# Patient Record
Sex: FEMALE | Race: BLACK OR AFRICAN AMERICAN | NOT HISPANIC OR LATINO | Employment: FULL TIME | URBAN - METROPOLITAN AREA
[De-identification: names, ages, dates, MRNs, and addresses within clinical notes are randomized per-mention and may not be internally consistent; named-entity substitution may affect disease eponyms.]

---

## 2017-01-12 DIAGNOSIS — Z30.09 FAMILY PLANNING: ICD-10-CM

## 2017-01-12 RX ORDER — NORELGESTROMIN AND ETHINYL ESTRADIOL 35; 150 UG/MG; UG/MG
1 PATCH TRANSDERMAL WEEKLY
Qty: 3 PATCH | Refills: 11 | Status: SHIPPED | OUTPATIENT
Start: 2017-01-12 | End: 2018-06-19 | Stop reason: ALTCHOICE

## 2017-03-22 ENCOUNTER — OFFICE VISIT (OUTPATIENT)
Dept: OBSTETRICS AND GYNECOLOGY | Facility: CLINIC | Age: 22
End: 2017-03-22
Payer: MEDICAID

## 2017-03-22 VITALS
SYSTOLIC BLOOD PRESSURE: 117 MMHG | HEIGHT: 64 IN | WEIGHT: 149.13 LBS | DIASTOLIC BLOOD PRESSURE: 82 MMHG | BODY MASS INDEX: 25.46 KG/M2

## 2017-03-22 DIAGNOSIS — Z01.419 WELL WOMAN EXAM WITH ROUTINE GYNECOLOGICAL EXAM: Primary | ICD-10-CM

## 2017-03-22 DIAGNOSIS — Z12.4 CERVICAL CANCER SCREENING: ICD-10-CM

## 2017-03-22 DIAGNOSIS — Z11.3 SCREEN FOR STD (SEXUALLY TRANSMITTED DISEASE): ICD-10-CM

## 2017-03-22 PROCEDURE — 87591 N.GONORRHOEAE DNA AMP PROB: CPT

## 2017-03-22 PROCEDURE — 99395 PREV VISIT EST AGE 18-39: CPT | Mod: S$PBB,,, | Performed by: OBSTETRICS & GYNECOLOGY

## 2017-03-22 PROCEDURE — 99212 OFFICE O/P EST SF 10 MIN: CPT | Mod: PBBFAC | Performed by: OBSTETRICS & GYNECOLOGY

## 2017-03-22 PROCEDURE — 99999 PR PBB SHADOW E&M-EST. PATIENT-LVL II: CPT | Mod: PBBFAC,,, | Performed by: OBSTETRICS & GYNECOLOGY

## 2017-03-22 PROCEDURE — 88175 CYTOPATH C/V AUTO FLUID REDO: CPT | Performed by: PATHOLOGY

## 2017-03-22 PROCEDURE — 88141 CYTOPATH C/V INTERPRET: CPT | Mod: ,,, | Performed by: PATHOLOGY

## 2017-03-22 RX ORDER — METRONIDAZOLE 500 MG/1
TABLET ORAL
COMMUNITY
Start: 2017-03-20 | End: 2017-04-07 | Stop reason: ALTCHOICE

## 2017-03-22 NOTE — MR AVS SNAPSHOT
"    [facility] [place] - OB/ GYN  [address]  [place] [address]  Phone: [phone]                  Pradeep Arias   3/22/2017 3:40 PM   Office Visit    Description:  Female : 1995   Provider:  Hany Carpenter MD   Department:  [facility] [place] - OB/ GYN           Reason for Visit     Annual Exam           Diagnoses this Visit        Comments    Cervical cancer screening    -  Primary     Screen for STD (sexually transmitted disease)                To Do List           Goals (5 Years of Data)     None      [facility]     [facility] On Call Nurse Care Line -  Assistance  Registered nurses in the [facility] provide clinical advisement, health education, appointment booking, and other advisory services.  Call for this free service at [phone].             Medications           Message regarding Medications     Verify the changes and/or additions to your medication regime listed below are the same as discussed with your clinician today.  If any of these changes or additions are incorrect, please notify your healthcare provider.             Verify that the below list of medications is an accurate representation of the medications you are currently taking.  If none reported, the list may be blank. If incorrect, please contact your healthcare provider. Carry this list with you in case of emergency.           Current Medications     metronidazole (FLAGYL) 500 MG tablet     norelgestromin-ethinyl estradiol (ORTHO EVRA) 150-35 mcg/24 hr Place 1 patch onto the skin once a week. For 3 weeks each month and off one week.    prenatal vitamin#7-iron-FA-dha 28-1. mg Cap Take 1 tablet by mouth once daily.           Clinical Reference Information           Your Vitals Were     BP Height Weight Last Period BMI    117/82 (BP Location: Left arm, Patient Position: Sitting, BP Method: Manual) 5' 4" (1.626 m) 67.7 kg (149 lb 2.3 oz) 2017 (Exact Date) 25.6 kg/m2      Blood Pressure       "    Most Recent Value    BP  117/82      Allergies as of 3/22/2017     Codeine      Immunizations Administered on Date of Encounter - 3/22/2017     None      Orders Placed During Today's Visit      Normal Orders This Visit    C. trachomatis/N. gonorrhoeae by AMP DNA Cervix     Liquid-based pap smear, screening       Language Assistance Services     ATTENTION: Language assistance services are available, free of charge. Please call [phone].      ATENCIÓN: Si habla español, tiene a cruz disposición servicios gratuitos de asistencia lingüística. Llame al [phone].     CHÚ Ý: N?u b?n nói Ti?ng Vi?t, có các d?ch v? h? tr? ngôn ng? mi?n phí dành cho b?n. G?i s? [phone].         [facility] [place] - OB/ GYN complies with applicable Federal civil rights laws and does not discriminate on the basis of race, color, national origin, age, disability, or sex.

## 2017-03-23 LAB
C TRACH DNA SPEC QL NAA+PROBE: NOT DETECTED
N GONORRHOEA DNA SPEC QL NAA+PROBE: NOT DETECTED

## 2017-03-26 NOTE — PROGRESS NOTES
"[facility]  Ambulatory Clinic  Obstetrics & Gynecology    Visit Date:  3/22/2017    Chief Complaint:  Annual GYN exam    History of Present Illness:      Pradeep Arias is a 21 y.o.  here for a gynecologic exam.      Pt has no major complaints today.      Periods are regular, not heavy or painful.    Her current method of family planning is Ortho-Evra patches weekly, and reports no problems with this method.      Pt denies family h/o adverse reaction to hormonal contraception.    Pt denies active sexually transmitted infections.    Pt performs monthly self breast examination, non-smoker, uses seat belts, and denies abuse.     Pt denies any abnormal vaginal bleeding, vaginal discharge, dysmenorrhea, dyspareunia, pelvic pain, bloating, early satiety, unintentional weight loss, breast mass/skin changes, GI or urinary complaints.      Otherwise, the pt is in her usual state of health.    Past History:  Gynecologic history as noted above.    Review of Systems:      GENERAL:  No fever, fatigue, excessive weight gain or loss  HEENT:  No headaches, hearing changes, visual disturbance  RESPIRATORY:  No cough, shortness of breath  CARDIOVASCULAR:  No chest pain, heart palpitations, leg swelling  BREAST:  No lump, pain, nipple discharge, skin changes  GASTROINTESTINAL:  No nausea, vomiting, constipation, diarrhea, abd pain, rectal bleeding   GENITOURINARY:  See HPI  ENDOCRINE:  No heat or cold intolerance  HEMATOLOGIC:  No easy bruisability or bleeding   LYMPHATICS:  No enlarged nodes  MUSCULOSKELETAL:  No joint pain or swelling  SKIN:  No rash, lesions, jaundice  NEUROLOGIC:  No dizziness, weakness, syncope  PSYCHIATRIC:  No depression, homicidal/suicidal ideations, anxiety or mood swings    Physical Exam:     /82 (BP Location: Left arm, Patient Position: Sitting, BP Method: Manual)  Ht 5' 4" (1.626 m)  Wt 67.7 kg (149 lb 2.3 oz)  LMP 2017 (Exact Date)  Breastfeeding? No  " BMI 25.6 kg/m2  Pulse 64, Resp rate 16  Patient's last menstrual period was 2017 (exact date).     GENERAL:  No acute distress, well-nourished  HEENT:  Atraumatic, anicteric, moist mucus membranes. Neck supple w/o masses.  BREAST:  Symmetric, nontender, no obvious masses, adenopathy, skin changes or nipple discharge.  LUNGS:  Clear to auscultation  HEART:  Regular rate and rhythm, no murmurs, gallops, or rubs  ABDOMEN:  Soft, non-tender, non-distended, normoactive bowel sounds, no obvious organomegaly  EXT:  Symmetric w/o cramping, claudication, or edema. +2 distal pulses.  SKIN:  No rashes or bruising  PSYCH:  Mood and affect appropriate    GENITOURINARY:    VULVAR:  Female external genitalia w/o obvious lesions. Female hair distribution. Normal urethral meatus. No gross lymphadenopathy.   VAGINA:  Pink, moist, well-rugated. Good support. No obvious lesion. No discharge.  CERVIX:  No cervical motion tenderness, discharge, or obvious lesions.   UTERUS:  Small, non-tender, normal contour  ADNEXA:  No masses, non-tender    RECTAL:  Declined. No obvious external lesions  WET PREP:  Negative     Chaperone present for exam.    Assessment:     21 y.o. :    1. Well woman gynecologic exam  2. Family planning    Plan:    A gynecologic health assessment was performed with age appropriate counseling.    Cervical cancer screening - pap obtained.    STI screening - pt requested gonorrhea & chlamydia testing.  Pt declined other STI testing including HIV.  Safe sex discussed.      Continue ortho evra.  Risks, benefits, and alternatives to Ortho Evra reviewed.    Encourage healthy lifestyle modifications, monthly self breast exams, encourage HPV vaccination, Ca/Vit D, f/u with PCP for health maintenance.    Return 1 year for gynecologic exam, or sooner as needed.  All questions answered, pt voiced understanding.        Hany Carpenter MD

## 2017-03-30 ENCOUNTER — OFFICE VISIT (OUTPATIENT)
Dept: OBSTETRICS AND GYNECOLOGY | Facility: CLINIC | Age: 22
End: 2017-03-30
Payer: MEDICAID

## 2017-03-30 VITALS
HEIGHT: 64 IN | DIASTOLIC BLOOD PRESSURE: 80 MMHG | BODY MASS INDEX: 25.06 KG/M2 | SYSTOLIC BLOOD PRESSURE: 122 MMHG | WEIGHT: 146.81 LBS

## 2017-03-30 DIAGNOSIS — R87.619 ABNORMAL CERVICAL PAPANICOLAOU SMEAR, UNSPECIFIED ABNORMAL PAP FINDING: Primary | ICD-10-CM

## 2017-03-30 PROCEDURE — 99213 OFFICE O/P EST LOW 20 MIN: CPT | Mod: S$PBB,,, | Performed by: OBSTETRICS & GYNECOLOGY

## 2017-03-30 PROCEDURE — 99212 OFFICE O/P EST SF 10 MIN: CPT | Mod: PBBFAC | Performed by: OBSTETRICS & GYNECOLOGY

## 2017-03-30 PROCEDURE — 99999 PR PBB SHADOW E&M-EST. PATIENT-LVL II: CPT | Mod: PBBFAC,,, | Performed by: OBSTETRICS & GYNECOLOGY

## 2017-03-30 NOTE — MR AVS SNAPSHOT
"    [facility] [place] - OB/ GYN  [address]  [place] [address]  Phone: [phone]                  Pradeep Arias   3/30/2017 3:50 PM   Office Visit    Description:  Female : 1995   Provider:  Hany Carpenter MD   Department:  [facility] [place] - OB/ GYN           Reason for Visit     Abnormal Pap Smear                To Do List           Goals (5 Years of Data)     None      [facility]     [facility] Nurse Care Line -  Assistance  Unless otherwise directed by your provider, please contact [facility], our nurse care line that is available for  assistance.     Registered nurses in the [facility] Center provide: appointment scheduling, clinical advisement, health education, and other advisory services.  Call: [phone] (toll free)               Medications           Message regarding Medications     Verify the changes and/or additions to your medication regime listed below are the same as discussed with your clinician today.  If any of these changes or additions are incorrect, please notify your healthcare provider.             Verify that the below list of medications is an accurate representation of the medications you are currently taking.  If none reported, the list may be blank. If incorrect, please contact your healthcare provider. Carry this list with you in case of emergency.           Current Medications     metronidazole (FLAGYL) 500 MG tablet     norelgestromin-ethinyl estradiol (ORTHO EVRA) 150-35 mcg/24 hr Place 1 patch onto the skin once a week. For 3 weeks each month and off one week.    prenatal vitamin#7-iron-FA-dha 28-1. mg Cap Take 1 tablet by mouth once daily.           Clinical Reference Information           Your Vitals Were     BP Height Weight Last Period BMI    122/80 (BP Location: Left arm, Patient Position: Sitting, BP Method: Manual) 5' 4" (1.626 m) 66.6 kg (146 lb 13.2 oz) 2017 (Exact Date) 25.2 kg/m2      Blood Pressure       "    Most Recent Value    BP  122/80      Allergies as of 3/30/2017     Codeine      Immunizations Administered on Date of Encounter - 3/30/2017     None      Language Assistance Services     ATTENTION: Language assistance services are available, free of charge. Please call [phone].      ATENCIÓN: Si habla valentín, tiene a cruz disposición servicios gratuitos de asistencia lingüística. Llame al [phone].     CHÚ Ý: N?u b?n nói Ti?ng Vi?t, có các d?ch v? h? tr? ngôn ng? mi?n phí dành cho b?n. G?i s? [phone].         [facility] - OB/ GYN complies with applicable Federal civil rights laws and does not discriminate on the basis of race, color, national origin, age, disability, or sex.

## 2017-03-31 ENCOUNTER — PROCEDURE VISIT (OUTPATIENT)
Dept: OBSTETRICS AND GYNECOLOGY | Facility: CLINIC | Age: 22
End: 2017-03-31
Payer: MEDICAID

## 2017-03-31 VITALS — WEIGHT: 146.81 LBS | BODY MASS INDEX: 25.06 KG/M2 | HEIGHT: 64 IN

## 2017-03-31 DIAGNOSIS — R87.619 ABNORMAL CERVICAL PAPANICOLAOU SMEAR, UNSPECIFIED ABNORMAL PAP FINDING: Primary | ICD-10-CM

## 2017-03-31 DIAGNOSIS — Z32.02 NEGATIVE PREGNANCY TEST: ICD-10-CM

## 2017-03-31 PROCEDURE — 88305 TISSUE EXAM BY PATHOLOGIST: CPT | Mod: 26,,, | Performed by: PATHOLOGY

## 2017-03-31 PROCEDURE — 57454 BX/CURETT OF CERVIX W/SCOPE: CPT | Mod: PBBFAC | Performed by: OBSTETRICS & GYNECOLOGY

## 2017-03-31 PROCEDURE — 88305 TISSUE EXAM BY PATHOLOGIST: CPT | Performed by: PATHOLOGY

## 2017-03-31 PROCEDURE — 57454 BX/CURETT OF CERVIX W/SCOPE: CPT | Mod: S$PBB,,, | Performed by: OBSTETRICS & GYNECOLOGY

## 2017-03-31 NOTE — PROCEDURES
"[facility]  Ambulatory Clinic   Obstetrics & Gynecology    Colposcopy Procedure Note  (CPT 96626)    Date:  3/31/2017    LMP: 3/01/2017    UPT:  Negative    Indications:  Pradeep Arias is a 21 y.o.  who presents for colposcopy secondary to suspected adenocarcinoma Pap smear on 3/2017.  Pt denies any abnormal pap prior to this time.  Previous pap was normal 2013.    Vitals:  Ht 5' 4" (1.626 m)  Wt 66.6 kg (146 lb 13.2 oz)  LMP 2017 (Exact Date)  BMI 25.2 kg/m2    Procedure Details:  The risks and benefits of the procedure discussed and written informed consent obtained.  All questions were answered prior to the initiation of the procedure.  A time out was performed to identify the correct patient and procedure.  Speculum placed in vagina and excellent visualization of cervix achieved, cervix swabbed x 3 with acetic acid solution.  Ectocervical biopsy was not performed.  ECC performed.  Specimens labelled and sent to Pathology.  Hemostasis was achieved with Monsel's solution.  All instruments removed.  The patient tolerated the procedure well.  Sterile technique maintained.  VSSAF.  Pain scale 0/10.    Findings:    Colposcopy satisfactory:  Yes  Cervix: acetowhite lesion(s), punctation, and mosaicism noted at 12, 5, 7 o'clock    Specimens:     12 o'clock ectocervix  5 o'clock ectocervix  7 o'clock ectocervix  ECC    Complications:  None      Colposcopy Impression:   BILL 3, concerning for carcinoma    Plan:    Specimens labelled and sent to Pathology.  Will base further treatment on Pathology findings.  Usual post procedural instructions and precautions reviewed.    Report excessive pain or bleeding to the office as soon as possible.    Follow up in 1 week to discuss results.  Importance of follow up stressed.    All of her questions answered, pt/mother of pt voiced understanding.      Hany Carpenter MD      "

## 2017-04-02 NOTE — PROGRESS NOTES
"[facility]  Ambulatory Clinic  Obstetrics & Gynecology    Visit Date:  3/30/2017    Chief Complaint:  Abnormal pap smear    History of Present Illness:      Pradeep Arias is a 21 y.o.  here to discuss pap smear result.    Pap results done this month shows cell suspicious for adenocarcinoma.    Pt denies h/o abnormal pap.    Last pap prior 2013 was normal.    Pt has no major complaints today.      Pt denies any abnormal vaginal bleeding, vaginal discharge, dysmenorrhea, dyspareunia, pelvic pain, bloating, early satiety, unintentional weight loss, GI or urinary complaints.      Sister present for visit.    Review of Systems:      GENERAL:  No fever, fatigue, excessive weight gain or loss  GASTROINTESTINAL:  No nausea, vomiting, constipation, diarrhea, abd pain, rectal bleeding   GENITOURINARY:  See HPI  LYMPHATICS:  No enlarged nodes    Physical Exam:     /80 (BP Location: Left arm, Patient Position: Sitting, BP Method: Manual)  Ht 5' 4" (1.626 m)  Wt 66.6 kg (146 lb 13.2 oz)  LMP 2017 (Exact Date)  BMI 25.2 kg/m2     GENERAL:  No acute distress, well-nourished  PSYCH:  Mood and affect appropriate  PELVIC:  Pt declined, not clinically indicated.    Chaperone present for exam.    Pap 3/22/2017    Suspicious cells present. Suspect adenocarcinoma.    Assessment:     21 y.o. :    1. Abnormal pap - suspicious for adenocarcinoma    Plan:    We discussed her abnormal pap.  Pt is interested in colposcopy for further evaluation.  Pt advised excisional procedure will likely be needed for further evaluation.  Will refer to GYN oncology pending colpo bx results.   Importance of follow up strongly advised.    STI screening - pt declined.  Safe sex discussed.      Return  for colposcopy.      All questions answered, pt voiced understanding.        Hany Carpenter MD        "

## 2017-04-07 ENCOUNTER — OFFICE VISIT (OUTPATIENT)
Dept: OBSTETRICS AND GYNECOLOGY | Facility: CLINIC | Age: 22
End: 2017-04-07
Payer: MEDICAID

## 2017-04-07 VITALS
SYSTOLIC BLOOD PRESSURE: 122 MMHG | WEIGHT: 146.69 LBS | BODY MASS INDEX: 25.04 KG/M2 | DIASTOLIC BLOOD PRESSURE: 80 MMHG | HEIGHT: 64 IN

## 2017-04-07 DIAGNOSIS — C53.9 ADENOCARCINOMA OF CERVIX: Primary | ICD-10-CM

## 2017-04-07 PROCEDURE — 99213 OFFICE O/P EST LOW 20 MIN: CPT | Mod: PBBFAC | Performed by: OBSTETRICS & GYNECOLOGY

## 2017-04-07 PROCEDURE — 99999 PR PBB SHADOW E&M-EST. PATIENT-LVL III: CPT | Mod: PBBFAC,,, | Performed by: OBSTETRICS & GYNECOLOGY

## 2017-04-07 PROCEDURE — 99213 OFFICE O/P EST LOW 20 MIN: CPT | Mod: S$PBB,,, | Performed by: OBSTETRICS & GYNECOLOGY

## 2017-04-07 NOTE — PROGRESS NOTES
"[facility]  Ambulatory Clinic  Obstetrics & Gynecology    Visit Date:  2017    Chief Complaint:  F/u colposcopy result    History of Present Illness:      Pradeep Arias is a 21 y.o.  here for follow up of colposcopy results.    Pt had pap done 3/31/17 showed cell suspicious for adenocarcinoma.    Pt underwent colposcopy 3/31/17 and preliminary verbal result per pathologist on call at [facility] is consistent with adenocarcinoma.  The colposcopy specimen has been also sent to outside facility for further analysis.    Pt denies h/o abnormal pap and last pap prior 2013 was normal.    Pt has no major complaints today.      Pt denies abnormal vaginal bleeding, vaginal discharge, dysmenorrhea, dyspareunia, pelvic pain, bloating, early satiety, unintentional weight loss, GI or urinary complaints.      Pt reports good family support.    Review of Systems:      GENERAL:  No fever, fatigue, excessive weight gain or loss  GASTROINTESTINAL:  No abd pain  GENITOURINARY:  See HPI  LYMPHATICS:  No enlarged nodes    Physical Exam:     /80 (BP Location: Left arm, Patient Position: Sitting, BP Method: Manual)  Ht 5' 4" (1.626 m)  Wt 66.6 kg (146 lb 11.5 oz)  LMP 2017 (Exact Date)  Breastfeeding? No  BMI 25.18 kg/m2   Pulse 60, Resp rate 16     GENERAL:  No acute distress, well-nourished  PSYCH:  Mood and affect appropriate    Chaperone present for exam.    Assessment:     21 y.o. :    1. Pap - suspicious for adenocarcinoma consistent with preliminary verbal colposcopy biopsy results    Plan:    I discussed with pt in great detail her condition.  Informed pt of preliminary colposcopy bx results c/w adenocarcinoma.  Will refer pt to GYN oncology for further evaluation and treatment.  Importance of follow up GYN oncology strongly advised.    Return as needed.    All questions answered, pt voiced understanding.        Hany Carpenter MD        "

## 2017-04-07 NOTE — MR AVS SNAPSHOT
"    [facility] [place] [facility]  [address]  [place] [address]  Phone: [phone]                  Pradeep Arias   2017 3:50 PM   Office Visit    Description:  Female : 1995   Provider:  Hany Carpenter MD   Department:  [facility] [place] - [facility]/ GYN           Reason for Visit     Follow-up                To Do List           Goals (5 Years of Data)     None      [facility]     [facility] Nurse Care Line -  Assistance  Unless otherwise directed by your provider, please contact [facility], our nurse care line that is available for  assistance.     Registered nurses in the [facility] provide: appointment scheduling, clinical advisement, health education, and other advisory services.  Call: [phone] (toll free)               Medications           Message regarding Medications     Verify the changes and/or additions to your medication regime listed below are the same as discussed with your clinician today.  If any of these changes or additions are incorrect, please notify your healthcare provider.        STOP taking these medications     metronidazole (FLAGYL) 500 MG tablet            Verify that the below list of medications is an accurate representation of the medications you are currently taking.  If none reported, the list may be blank. If incorrect, please contact your healthcare provider. Carry this list with you in case of emergency.           Current Medications     norelgestromin-ethinyl estradiol (ORTHO EVRA) 150-35 mcg/24 hr Place 1 patch onto the skin once a week. For 3 weeks each month and off one week.    prenatal vitamin#7-iron-FA-dha 28-1. mg Cap Take 1 tablet by mouth once daily.           Clinical Reference Information           Your Vitals Were     BP Height Weight Last Period BMI    122/80 (BP Location: Left arm, Patient Position: Sitting, BP Method: Manual) 5' 4" (1.626 m) 66.6 kg (146 lb 11.5 oz) 2017 (Exact Date) 25.18 kg/m2 "      Blood Pressure          Most Recent Value    BP  122/80      Allergies as of 4/7/2017     Codeine      Immunizations Administered on Date of Encounter - 4/7/2017     None      Language Assistance Services     ATTENTION: Language assistance services are available, free of charge. Please call [phone].      ATENCIÓN: Si habeddie laura, tiene a cruz disposición servicios gratuitos de asistencia lingüística. Llame al [phone].     CHÚ Ý: N?u b?n nói Ti?ng Vi?t, có các d?ch v? h? tr? ngôn ng? mi?n phí dành cho b?n. G?i s? [phone].         [facility] - OB/ GYN complies with applicable Federal civil rights laws and does not discriminate on the basis of race, color, national origin, age, disability, or sex.

## 2017-04-12 ENCOUNTER — INITIAL CONSULT (OUTPATIENT)
Dept: GYNECOLOGIC ONCOLOGY | Facility: CLINIC | Age: 22
End: 2017-04-12
Attending: OBSTETRICS & GYNECOLOGY
Payer: MEDICAID

## 2017-04-12 VITALS
HEIGHT: 64 IN | BODY MASS INDEX: 24.62 KG/M2 | DIASTOLIC BLOOD PRESSURE: 88 MMHG | HEART RATE: 76 BPM | WEIGHT: 144.19 LBS | SYSTOLIC BLOOD PRESSURE: 134 MMHG

## 2017-04-12 DIAGNOSIS — C53.9 CERVICAL ADENOCARCINOMA: ICD-10-CM

## 2017-04-12 PROCEDURE — 99999 PR PBB SHADOW E&M-EST. PATIENT-LVL III: CPT | Mod: PBBFAC,,, | Performed by: OBSTETRICS & GYNECOLOGY

## 2017-04-12 PROCEDURE — 99205 OFFICE O/P NEW HI 60 MIN: CPT | Mod: S$PBB,,, | Performed by: OBSTETRICS & GYNECOLOGY

## 2017-04-12 PROCEDURE — 99213 OFFICE O/P EST LOW 20 MIN: CPT | Mod: PBBFAC | Performed by: OBSTETRICS & GYNECOLOGY

## 2017-04-12 NOTE — LETTER
April 15, 2017      Hany Carpenter MD  [address]  [address]  [place] [address]           [facility] - Gynecologic Oncology  [address]  [facility] [address]  Phone: [phone]  Fax: [phone]          Patient: Pradeep Arias   MR Number: [id number]   YOB: 1995   Date of Visit: 4/12/2017       Dear Dr. Hany Carpenter:    Thank you for referring Pradeep Arias to me for evaluation. Attached you will find relevant portions of my assessment and plan of care.    If you have questions, please do not hesitate to call me. I look forward to following Pradeep Arias along with you.    Sincerely,    Merly Green MD    Enclosure  CC:  No Recipients    If you would like to receive this communication electronically, please contact [email or web address] or [phone] to request more information on [employer] access.    For providers and/or their staff who would like to refer a patient to [facility], please contact us through our one-stop-shop provider referral line, [facility], at [phone].    If you feel you have received this communication in error or would no longer like to receive these types of communications, please e-mail [email or web address]

## 2017-04-13 ENCOUNTER — HOSPITAL ENCOUNTER (OUTPATIENT)
Dept: RADIOLOGY | Facility: OTHER | Age: 22
Discharge: HOME | End: 2017-04-13
Attending: OBSTETRICS & GYNECOLOGY
Payer: MEDICAID

## 2017-04-13 DIAGNOSIS — C53.9 CERVICAL ADENOCARCINOMA: ICD-10-CM

## 2017-04-13 PROCEDURE — 71260 CT THORAX DX C+: CPT | Mod: 26,,, | Performed by: RADIOLOGY

## 2017-04-13 PROCEDURE — 71260 CT THORAX DX C+: CPT | Mod: TC

## 2017-04-13 PROCEDURE — 25500020 PHARM REV CODE 255: Performed by: OBSTETRICS & GYNECOLOGY

## 2017-04-13 PROCEDURE — 74177 CT ABD & PELVIS W/CONTRAST: CPT | Mod: TC

## 2017-04-13 PROCEDURE — 74177 CT ABD & PELVIS W/CONTRAST: CPT | Mod: 26,,, | Performed by: RADIOLOGY

## 2017-04-13 RX ADMIN — IOHEXOL 75 ML: 350 INJECTION, SOLUTION INTRAVENOUS at 04:04

## 2017-04-13 RX ADMIN — IOHEXOL 25 ML: 350 INJECTION, SOLUTION INTRAVENOUS at 04:04

## 2017-04-15 ENCOUNTER — PATIENT MESSAGE (OUTPATIENT)
Dept: GYNECOLOGIC ONCOLOGY | Facility: CLINIC | Age: 22
End: 2017-04-15

## 2017-04-15 NOTE — PROGRESS NOTES
Subjective:      Patient ID: Pradeep Arias is a 21 y.o. female.    Chief Complaint: Advice Only (Shahaben ADENOCARCINOMA)      HPI  Patient is a 22yo  who presents today as a referral from Dr. Hany Carpenter for newly diagnosed cervical adenocarcinoma. Pap smear 3/22/17 showed cells suspicious for adenocarcinoma. Dr. Carpenter performed a cervical biopsy 3/31/17 prelim path verbal invasive adenocarcinoma, final path pending.     No significant medical history. No abdominal surgeries. Desires future childbearing.      Review of Systems   Constitutional: Negative for appetite change, chills, fatigue and fever.   HENT: Negative for mouth sores.    Respiratory: Negative for cough and shortness of breath.    Cardiovascular: Negative for leg swelling.   Gastrointestinal: Negative for abdominal pain, blood in stool, constipation and diarrhea.   Endocrine: Negative for cold intolerance.   Genitourinary: Negative for dysuria and vaginal bleeding.   Musculoskeletal: Negative for myalgias.   Skin: Negative for rash.   Allergic/Immunologic: Negative.    Neurological: Negative for weakness and numbness.   Hematological: Negative for adenopathy. Does not bruise/bleed easily.   Psychiatric/Behavioral: Negative for confusion.        Past Medical History:   Diagnosis Date    Abnormal Pap smear of cervix 2017    Herpes simplex virus (HSV) infection      History reviewed. No pertinent surgical history.  Family History   Problem Relation Age of Onset    Breast cancer Paternal Aunt     Stroke Paternal Grandfather     Cancer Paternal Grandmother     Stroke Maternal Grandfather     Colon cancer Neg Hx     Ovarian cancer Neg Hx      Social History     Social History    Marital status: Single     Spouse name: N/A    Number of children: N/A    Years of education: N/A     Occupational History    Not on file.     Social History Main Topics    Smoking status: Never Smoker    Smokeless tobacco: Not on file     Alcohol use Yes    Drug use: No    Sexual activity: Yes     Partners: Male     Birth control/ protection: None     Other Topics Concern    Not on file     Social History Narrative     Current Outpatient Prescriptions   Medication Sig    norelgestromin-ethinyl estradiol (ORTHO EVRA) 150-35 mcg/24 hr Place 1 patch onto the skin once a week. For 3 weeks each month and off one week.    prenatal vitamin#7-iron-FA-dha 28-1. mg Cap Take 1 tablet by mouth once daily.     No current facility-administered medications for this visit.      Review of patient's allergies indicates:   Allergen Reactions    Codeine        Objective:   Physical Exam:   Constitutional: She is oriented to person, place, and time. She appears well-developed and well-nourished.    HENT:   Head: Normocephalic and atraumatic.    Eyes: EOM are normal. Pupils are equal, round, and reactive to light.    Neck: Normal range of motion. Neck supple. No thyromegaly present.    Cardiovascular: Normal rate, regular rhythm and intact distal pulses.     Pulmonary/Chest: Effort normal and breath sounds normal. No respiratory distress. She has no wheezes.        Abdominal: Soft. Bowel sounds are normal. She exhibits no distension, no ascites and no mass. There is no tenderness.     Genitourinary: Rectum normal, vagina normal and uterus normal. Pelvic exam was performed with patient supine. There is no lesion on the right labia. There is no lesion on the left labia. Right adnexum displays no mass. Left adnexum displays no mass.       Genitourinary Comments: There is a small 0.5cm lesion noted at the cervical os.            Musculoskeletal: Normal range of motion and moves all extremeties.      Lymphadenopathy:     She has no cervical adenopathy.        Right: No inguinal and no supraclavicular adenopathy present.        Left: No inguinal and no supraclavicular adenopathy present.    Neurological: She is alert and oriented to person, place, and time.    Skin:  Skin is warm and dry. No rash noted.    Psychiatric: She has a normal mood and affect.       Assessment:     1. Cervical adenocarcinoma        Plan:     Orders Placed This Encounter   Procedures    CT Chest With Contrast    CT Abdomen With Contrast    MRI Pelvis With Contrast    CT Abdomen Pelvis With Contrast       I discussed with the patient and her family the natural history of cervical adenocarcinoma. We are still awaiting final pathology. If cervical biopsy confirms invasive carcinoma she would be a clinical Stage IB1. If biopsy is not confirmatory will need CKC for diagnostic purposes. Will also obtain CT (we cannot obtain PET due to insurance) and MRI for evaluation of metastatic disease. Standard treatment for Stage IB1 would include radical hysterectomy or radical trachelectomy. However there is emerging data supporting ultraconservative treatment with CKC for low risk disease in young women who desire to preserve fertility. It is premature to determine optimal management options at this time.     Will await final pathology and imaging. RTC 1-2 weeks for further treatment planning.     The patient and her family were allowed to ask questions and all were answered to their satisfaction.

## 2017-04-25 ENCOUNTER — HOSPITAL ENCOUNTER (OUTPATIENT)
Dept: RADIOLOGY | Facility: OTHER | Age: 22
Discharge: HOME | End: 2017-04-25
Attending: OBSTETRICS & GYNECOLOGY
Payer: MEDICAID

## 2017-04-25 DIAGNOSIS — C53.9 CERVICAL ADENOCARCINOMA: ICD-10-CM

## 2017-04-25 PROCEDURE — 72197 MRI PELVIS W/O & W/DYE: CPT | Mod: 26,,, | Performed by: RADIOLOGY

## 2017-04-25 PROCEDURE — 25500020 PHARM REV CODE 255: Performed by: OBSTETRICS & GYNECOLOGY

## 2017-04-25 PROCEDURE — 72197 MRI PELVIS W/O & W/DYE: CPT | Mod: TC

## 2017-04-25 RX ADMIN — IOHEXOL 7 ML: 350 INJECTION, SOLUTION INTRAVENOUS at 01:04

## 2017-04-26 ENCOUNTER — TELEPHONE (OUTPATIENT)
Dept: GYNECOLOGIC ONCOLOGY | Facility: CLINIC | Age: 22
End: 2017-04-26

## 2017-04-26 NOTE — TELEPHONE ENCOUNTER
Called to follow up with patient regarding MRI and biopsy results.     MRI without significant findings. Biopsy non-diagnostic. Will need CKC.     Left voicemail. Has appointment scheduled for 5/1.

## 2017-05-01 ENCOUNTER — OFFICE VISIT (OUTPATIENT)
Dept: GYNECOLOGIC ONCOLOGY | Facility: CLINIC | Age: 22
End: 2017-05-01
Attending: OBSTETRICS & GYNECOLOGY
Payer: MEDICAID

## 2017-05-01 VITALS
DIASTOLIC BLOOD PRESSURE: 84 MMHG | TEMPERATURE: 98 F | HEART RATE: 87 BPM | RESPIRATION RATE: 16 BRPM | BODY MASS INDEX: 25.11 KG/M2 | WEIGHT: 147.06 LBS | HEIGHT: 64 IN | SYSTOLIC BLOOD PRESSURE: 141 MMHG

## 2017-05-01 DIAGNOSIS — N87.9 CERVICAL DYSPLASIA: Primary | ICD-10-CM

## 2017-05-01 PROCEDURE — 99999 PR PBB SHADOW E&M-EST. PATIENT-LVL IV: CPT | Mod: PBBFAC,,, | Performed by: OBSTETRICS & GYNECOLOGY

## 2017-05-01 PROCEDURE — 99214 OFFICE O/P EST MOD 30 MIN: CPT | Mod: PBBFAC | Performed by: OBSTETRICS & GYNECOLOGY

## 2017-05-01 PROCEDURE — 99214 OFFICE O/P EST MOD 30 MIN: CPT | Mod: S$PBB,,, | Performed by: OBSTETRICS & GYNECOLOGY

## 2017-05-01 NOTE — LETTER
May 7, 2017        Hany Carpenter MD  [address]  [address]  [facility] [address]             [facility] - Gynecologic Oncology  [address]  [facility] [address]  Phone: [phone]  Fax: [phone]   Patient: Pradeep Arias   MR Number: [id number]   YOB: 1995   Date of Visit: 5/1/2017       Dear Dr. Carpenter:    Thank you for referring Pradeep Arias to me for evaluation. Below are the relevant portions of my assessment and plan of care.            If you have questions, please do not hesitate to call me. I look forward to following Pradeep along with you.    Sincerely,      Merly Green MD           CC  No Recipients

## 2017-05-02 ENCOUNTER — ANESTHESIA EVENT (OUTPATIENT)
Dept: SURGERY | Facility: HOSPITAL | Age: 22
End: 2017-05-02
Payer: MEDICAID

## 2017-05-02 DIAGNOSIS — Z01.818 PREOPERATIVE TESTING: Primary | ICD-10-CM

## 2017-05-02 RX ORDER — SODIUM CHLORIDE 9 MG/ML
INJECTION, SOLUTION INTRAVENOUS CONTINUOUS
Status: CANCELLED | OUTPATIENT
Start: 2017-05-02

## 2017-05-02 RX ORDER — LIDOCAINE HYDROCHLORIDE 10 MG/ML
1 INJECTION, SOLUTION EPIDURAL; INFILTRATION; INTRACAUDAL; PERINEURAL ONCE
Status: CANCELLED | OUTPATIENT
Start: 2017-05-02 | End: 2017-05-02

## 2017-05-02 RX ORDER — IBUPROFEN 200 MG
200 TABLET ORAL EVERY 6 HOURS PRN
COMMUNITY
End: 2017-05-24

## 2017-05-02 NOTE — ANESTHESIA PREPROCEDURE EVALUATION
Pre Admission Screening  Letitia King RN      []Hide copied text  Anesthesia Assessment: Preoperative EQUATION     Planned Procedure: Procedure(s) (LRB):  CONIZATION-CERVIX (CKC) (N/A)  Requested Anesthesia Type:General/MAC  Surgeon: Merly Green MD  Service: OB/GYN  Known or anticipated Date of Surgery:5/11/2017     Surgeon notes: reviewed     Electronic QUestionnaire Assessment completed via nurse interview with patient.         NO AQ     Triage considerations:      The patient has no apparent active cardiac condition (No unstable coronary Syndrome such as severe unstable angina or recent [<1 month] myocardial infarction, decompensated CHF, severe valvular disease or significant arrhythmia)     Previous anesthesia records:GETA and Epidural Anesthesia (Pt reports she needed Epidural x2 with childbirth 2016)- see note:   Reason for block: primary anesthetic   Diagnosis: IUP with previous epidural no longer working.   Start time: 8/19/2016 4:43 PM  Timeout: 8/19/2016 4:42 PM  End time: 8/19/2016 5:00 PM        Last PCP note: > 1 year ago , [facility]   Subspecialty notes: OBGYN     Other important co-morbidities: cervical dysplasia, HX of anemia with childbirth (no repeat bloodwork noted)      Tests already available:  Available tests, 6-12 months ago , within [facility] . CBC with childbirth      Instructions given. (See in Nurse's note)     Optimization:  Anesthesia Preop Clinic Assessment Indicated-not required for this procedure      Plan:   Testing: Hemoglobin (will repeat-due to anemia after childbirth)  Patient has previously scheduled Medical Appointment:none     Navigation: Tests Scheduled. Am of surgery          Straight Line to surgery.       Electronically signed by Letitia King RN at 5/2/2017  3:15 PM        Pre-admit on 5/11/2017              Detailed Report                                                                                                                          05/02/2017  Pradeep Arias is a 21 y.o., female.    Anesthesia Evaluation    I have reviewed the Patient Summary Reports.     I have reviewed the Medications.     Review of Systems  Anesthesia Hx:  History of prior surgery of interest to airway management or planning: Previous anesthesia: General, Epidural ORIF as a child with general anesthesia.   8/2016 for childbirth with epidural.  , unsuccessful Spinal/Epidural level at required 2 epidurals. Denies Family Hx of Anesthesia complications.   Denies Personal Hx of Anesthesia complications.   Social:  Non-Smoker, No Alcohol Use    Hematology/Oncology:     Oncology Normal    -- Anemia:   EENT/Dental:EENT/Dental Normal   Cardiovascular:  Cardiovascular Normal Exercise tolerance: good     Pulmonary:   Denies Shortness of breath.  Denies Recent URI.    Renal/:  Renal/ Normal     Hepatic/GI:  Hepatic/GI Normal    Musculoskeletal:  Musculoskeletal Normal    OB/GYN/PEDS:  Cervical dysplasia   Neurological:  Neurology Normal    Dermatological:  Skin Normal    Psych:  Psychiatric Normal           Physical Exam  General:  Well nourished    Airway/Jaw/Neck:  Airway Findings: Mouth Opening: Normal Tongue: Normal  General Airway Assessment: Adult, Good  Mallampati: III  TM Distance: Normal, at least 6 cm     Eyes/Ears/Nose:  EYES/EARS/NOSE FINDINGS: Normal   Dental:  Dental Findings: In tact   Chest/Lungs:  Chest/Lungs Findings: Clear to auscultation, Normal Respiratory Rate     Heart/Vascular:  Heart Findings: Rate: Normal  Rhythm: Regular Rhythm  Sounds: Normal  Heart murmur: negative       Mental Status:  Mental Status Findings:  Cooperative, Alert and Oriented         Anesthesia Plan  Type of Anesthesia, risks & benefits discussed:  Anesthesia Type:  general  Patient's Preference:   Intra-op Monitoring Plan:   Intra-op Monitoring Plan Comments:   Post Op Pain Control Plan:   Post Op Pain Control Plan Comments:   Induction:   IV  Beta Blocker:  Patient is not  currently on a Beta-Blocker (No further documentation required).       Informed Consent: Patient understands risks and agrees with Anesthesia plan.  Questions answered. Anesthesia consent signed with patient.  ASA Score: 1     Day of Surgery Review of History & Physical:    H&P update referred to the surgeon.     Anesthesia Plan Notes:   22F cervical dysplasia for conization under GA LMA        Ready For Surgery From Anesthesia Perspective.

## 2017-05-02 NOTE — PRE ADMISSION SCREENING
Anesthesia Assessment: Preoperative EQUATION    Planned Procedure: Procedure(s) (LRB):  CONIZATION-CERVIX (CKC) (N/A)  Requested Anesthesia Type:General/MAC  Surgeon: Merly Green MD  Service: OB/GYN  Known or anticipated Date of Surgery:5/11/2017    Surgeon notes: reviewed    Electronic QUestionnaire Assessment completed via nurse interview with patient.        NO AQ    Triage considerations:     The patient has no apparent active cardiac condition (No unstable coronary Syndrome such as severe unstable angina or recent [<1 month] myocardial infarction, decompensated CHF, severe valvular   disease or significant arrhythmia)    Previous anesthesia records:GETA and Epidural Anesthesia (Pt reports she needed  Epidural x2 with childbirth 2016)- see note:   Reason for block: primary anesthetic   Diagnosis: IUP with previous epidural no longer working.   Start time: 8/19/2016 4:43 PM  Timeout: 8/19/2016 4:42 PM  End time: 8/19/2016 5:00 PM      Last PCP note: > 1 year ago , within [facility]   Subspecialty notes: OBGYN    Other important co-morbidities: cervical dysplasia     Tests already available:  Available tests,  6-12 months ago , within [facility] . CBC with childbirth            Instructions given. (See in Nurse's note)    Optimization:  Anesthesia Preop Clinic Assessment  Indicated-not required for this procedure      Plan:    Testing:  Hemoglobin (due to anemia after childbirth)     Patient  has previously scheduled Medical Appointment:none    Navigation: Tests Scheduled. Am of surgery                              Straight Line to surgery.

## 2017-05-07 NOTE — PROGRESS NOTES
Subjective:      Patient ID: Pradeep Arias is a 22 y.o. female.    Chief Complaint: Follow-up (Review Results)      HPI  Patient is a 20yo  who originally presented as a referral from Dr. Hany Carpenter. Pap smear 3/22/17 showed cells suspicious for adenocarcinoma. Dr. Carpenter performed a cervical biopsy 3/31/17. Final pathology shows highly atypical cells after review at [facility].       No significant medical history. No abdominal surgeries. Desires future childbearing.     MRI pelvis 17 An actual mass is not seen. The inner and outer cervical stroma is preserved and no parametrial invasion adenopathy bone lesion or hydronephrosis is seen.    She presents today for further treatment planning.     Review of Systems   Constitutional: Negative for appetite change, chills, fatigue and fever.   HENT: Negative for mouth sores.    Respiratory: Negative for cough and shortness of breath.    Cardiovascular: Negative for leg swelling.   Gastrointestinal: Negative for abdominal pain, blood in stool, constipation and diarrhea.   Endocrine: Negative for cold intolerance.   Genitourinary: Negative for dysuria and vaginal bleeding.   Musculoskeletal: Negative for myalgias.   Skin: Negative for rash.   Allergic/Immunologic: Negative.    Neurological: Negative for weakness and numbness.   Hematological: Negative for adenopathy. Does not bruise/bleed easily.   Psychiatric/Behavioral: Negative for confusion.        Objective:   Physical Exam:   Constitutional: She is oriented to person, place, and time. She appears well-developed and well-nourished.    HENT:   Head: Normocephalic and atraumatic.    Eyes: EOM are normal. Pupils are equal, round, and reactive to light.    Neck: Normal range of motion. Neck supple. No thyromegaly present.    Cardiovascular: Normal rate, regular rhythm and intact distal pulses.     Pulmonary/Chest: Effort normal and breath sounds normal. No respiratory distress. She has no wheezes.         Abdominal: Soft. Bowel sounds are normal. She exhibits no distension, no ascites and no mass. There is no tenderness.             Musculoskeletal: Normal range of motion and moves all extremeties.      Lymphadenopathy:     She has no cervical adenopathy.        Right: No supraclavicular adenopathy present.        Left: No supraclavicular adenopathy present.    Neurological: She is alert and oriented to person, place, and time.    Skin: Skin is warm and dry. No rash noted.    Psychiatric: She has a normal mood and affect.       Assessment:     1. Cervical dysplasia        Plan:   No orders of the defined types were placed in this encounter.    The cervical biopsies show highly atypical cells, inconclusive for diagnosis of invasive carcinoma. I have therefore recommended CKC fur diagnostic and potentially therapeutic purposes in this young patient. MRI and CT show no obvious disease. She desires to proceed. The risks, benefits, and indications of the procedure were discussed with the patient and her family members if present.  These included bleeding, transfusion, infection, damage to surrounding tissues (bowel, bladder, ureter), wound separation, perioperative cardiac events, VTE, pneumonia, and possible death.  She voiced understanding, all questions were answered and consents were signed.  1. Plan for CKC 5/11/17  2. Pre op anesthesia

## 2017-05-09 ENCOUNTER — TELEPHONE (OUTPATIENT)
Dept: INFUSION THERAPY | Facility: HOSPITAL | Age: 22
End: 2017-05-09

## 2017-05-11 ENCOUNTER — HOSPITAL ENCOUNTER (OUTPATIENT)
Facility: HOSPITAL | Age: 22
Discharge: HOME | End: 2017-05-11
Attending: OBSTETRICS & GYNECOLOGY | Admitting: OBSTETRICS & GYNECOLOGY
Payer: MEDICAID

## 2017-05-11 ENCOUNTER — ANESTHESIA (OUTPATIENT)
Dept: SURGERY | Facility: HOSPITAL | Age: 22
End: 2017-05-11
Payer: MEDICAID

## 2017-05-11 VITALS
RESPIRATION RATE: 16 BRPM | SYSTOLIC BLOOD PRESSURE: 119 MMHG | OXYGEN SATURATION: 100 % | HEART RATE: 67 BPM | TEMPERATURE: 98 F | DIASTOLIC BLOOD PRESSURE: 71 MMHG

## 2017-05-11 DIAGNOSIS — Z98.890 STATUS POST CONIZATION OF CERVIX: Primary | ICD-10-CM

## 2017-05-11 DIAGNOSIS — N87.9 CERVICAL DYSPLASIA: ICD-10-CM

## 2017-05-11 DIAGNOSIS — Z01.818 PREOPERATIVE TESTING: ICD-10-CM

## 2017-05-11 LAB — HGB BLD-MCNC: 12.3 G/DL

## 2017-05-11 PROCEDURE — 85018 HEMOGLOBIN: CPT

## 2017-05-11 PROCEDURE — 88307 TISSUE EXAM BY PATHOLOGIST: CPT | Mod: 26,,, | Performed by: PATHOLOGY

## 2017-05-11 PROCEDURE — 25000003 PHARM REV CODE 250

## 2017-05-11 PROCEDURE — 25000003 PHARM REV CODE 250: Performed by: OBSTETRICS & GYNECOLOGY

## 2017-05-11 PROCEDURE — 27000221 HC OXYGEN, UP TO 24 HOURS

## 2017-05-11 PROCEDURE — 57520 CONIZATION OF CERVIX: CPT | Mod: ,,, | Performed by: OBSTETRICS & GYNECOLOGY

## 2017-05-11 PROCEDURE — 25000003 PHARM REV CODE 250: Performed by: NURSE ANESTHETIST, CERTIFIED REGISTERED

## 2017-05-11 PROCEDURE — 37000008 HC ANESTHESIA 1ST 15 MINUTES: Performed by: OBSTETRICS & GYNECOLOGY

## 2017-05-11 PROCEDURE — 71000033 HC RECOVERY, INTIAL HOUR: Performed by: OBSTETRICS & GYNECOLOGY

## 2017-05-11 PROCEDURE — 71000015 HC POSTOP RECOV 1ST HR: Performed by: OBSTETRICS & GYNECOLOGY

## 2017-05-11 PROCEDURE — 63600175 PHARM REV CODE 636 W HCPCS: Performed by: NURSE ANESTHETIST, CERTIFIED REGISTERED

## 2017-05-11 PROCEDURE — 88305 TISSUE EXAM BY PATHOLOGIST: CPT | Mod: 26,,, | Performed by: PATHOLOGY

## 2017-05-11 PROCEDURE — 88305 TISSUE EXAM BY PATHOLOGIST: CPT | Performed by: PATHOLOGY

## 2017-05-11 PROCEDURE — D9220A PRA ANESTHESIA: Mod: CRNA,,, | Performed by: NURSE ANESTHETIST, CERTIFIED REGISTERED

## 2017-05-11 PROCEDURE — 71000039 HC RECOVERY, EACH ADD'L HOUR: Performed by: OBSTETRICS & GYNECOLOGY

## 2017-05-11 PROCEDURE — 37000009 HC ANESTHESIA EA ADD 15 MINS: Performed by: OBSTETRICS & GYNECOLOGY

## 2017-05-11 PROCEDURE — 36000707: Performed by: OBSTETRICS & GYNECOLOGY

## 2017-05-11 PROCEDURE — 25000003 PHARM REV CODE 250: Performed by: STUDENT IN AN ORGANIZED HEALTH CARE EDUCATION/TRAINING PROGRAM

## 2017-05-11 PROCEDURE — 63600175 PHARM REV CODE 636 W HCPCS: Performed by: STUDENT IN AN ORGANIZED HEALTH CARE EDUCATION/TRAINING PROGRAM

## 2017-05-11 PROCEDURE — D9220A PRA ANESTHESIA: Mod: ANES,,, | Performed by: ANESTHESIOLOGY

## 2017-05-11 PROCEDURE — 36000706: Performed by: OBSTETRICS & GYNECOLOGY

## 2017-05-11 RX ORDER — DIPHENHYDRAMINE HYDROCHLORIDE 50 MG/ML
25 INJECTION INTRAMUSCULAR; INTRAVENOUS EVERY 4 HOURS PRN
Status: DISCONTINUED | OUTPATIENT
Start: 2017-05-11 | End: 2017-05-11 | Stop reason: HOSPADM

## 2017-05-11 RX ORDER — HYDROCODONE BITARTRATE AND ACETAMINOPHEN 5; 325 MG/1; MG/1
1 TABLET ORAL EVERY 4 HOURS PRN
Status: DISCONTINUED | OUTPATIENT
Start: 2017-05-11 | End: 2017-05-11 | Stop reason: HOSPADM

## 2017-05-11 RX ORDER — ONDANSETRON 2 MG/ML
4 INJECTION INTRAMUSCULAR; INTRAVENOUS DAILY PRN
Status: DISCONTINUED | OUTPATIENT
Start: 2017-05-11 | End: 2017-05-11 | Stop reason: HOSPADM

## 2017-05-11 RX ORDER — HYDROMORPHONE HYDROCHLORIDE 1 MG/ML
INJECTION, SOLUTION INTRAMUSCULAR; INTRAVENOUS; SUBCUTANEOUS
Status: DISCONTINUED
Start: 2017-05-11 | End: 2017-05-11 | Stop reason: WASHOUT

## 2017-05-11 RX ORDER — IBUPROFEN 200 MG
600 TABLET ORAL EVERY 6 HOURS PRN
Status: DISCONTINUED | OUTPATIENT
Start: 2017-05-11 | End: 2017-05-11 | Stop reason: HOSPADM

## 2017-05-11 RX ORDER — LIDOCAINE HYDROCHLORIDE 10 MG/ML
1 INJECTION, SOLUTION EPIDURAL; INFILTRATION; INTRACAUDAL; PERINEURAL ONCE
Status: DISCONTINUED | OUTPATIENT
Start: 2017-05-11 | End: 2017-05-11 | Stop reason: HOSPADM

## 2017-05-11 RX ORDER — SODIUM CHLORIDE, SODIUM LACTATE, POTASSIUM CHLORIDE, CALCIUM CHLORIDE 600; 310; 30; 20 MG/100ML; MG/100ML; MG/100ML; MG/100ML
INJECTION, SOLUTION INTRAVENOUS CONTINUOUS
Status: DISCONTINUED | OUTPATIENT
Start: 2017-05-11 | End: 2017-05-11 | Stop reason: HOSPADM

## 2017-05-11 RX ORDER — HYDROMORPHONE HYDROCHLORIDE 1 MG/ML
0.2 INJECTION, SOLUTION INTRAMUSCULAR; INTRAVENOUS; SUBCUTANEOUS EVERY 5 MIN PRN
Status: DISCONTINUED | OUTPATIENT
Start: 2017-05-11 | End: 2017-05-11 | Stop reason: HOSPADM

## 2017-05-11 RX ORDER — SODIUM CHLORIDE 0.9 % (FLUSH) 0.9 %
3 SYRINGE (ML) INJECTION EVERY 8 HOURS
Status: DISCONTINUED | OUTPATIENT
Start: 2017-05-11 | End: 2017-05-11 | Stop reason: HOSPADM

## 2017-05-11 RX ORDER — ONDANSETRON 8 MG/1
8 TABLET, ORALLY DISINTEGRATING ORAL EVERY 8 HOURS PRN
Status: DISCONTINUED | OUTPATIENT
Start: 2017-05-11 | End: 2017-05-11 | Stop reason: HOSPADM

## 2017-05-11 RX ORDER — FENTANYL CITRATE 50 UG/ML
INJECTION, SOLUTION INTRAMUSCULAR; INTRAVENOUS
Status: DISCONTINUED | OUTPATIENT
Start: 2017-05-11 | End: 2017-05-11

## 2017-05-11 RX ORDER — HYDROMORPHONE HYDROCHLORIDE 1 MG/ML
1 INJECTION, SOLUTION INTRAMUSCULAR; INTRAVENOUS; SUBCUTANEOUS EVERY 4 HOURS PRN
Status: DISCONTINUED | OUTPATIENT
Start: 2017-05-11 | End: 2017-05-11 | Stop reason: HOSPADM

## 2017-05-11 RX ORDER — HYDROCODONE BITARTRATE AND ACETAMINOPHEN 10; 325 MG/1; MG/1
1 TABLET ORAL EVERY 4 HOURS PRN
Status: DISCONTINUED | OUTPATIENT
Start: 2017-05-11 | End: 2017-05-11 | Stop reason: HOSPADM

## 2017-05-11 RX ORDER — PROPOFOL 10 MG/ML
VIAL (ML) INTRAVENOUS
Status: DISCONTINUED | OUTPATIENT
Start: 2017-05-11 | End: 2017-05-11

## 2017-05-11 RX ORDER — MIDAZOLAM HYDROCHLORIDE 1 MG/ML
INJECTION, SOLUTION INTRAMUSCULAR; INTRAVENOUS
Status: DISCONTINUED | OUTPATIENT
Start: 2017-05-11 | End: 2017-05-11

## 2017-05-11 RX ORDER — SODIUM CHLORIDE 9 MG/ML
INJECTION, SOLUTION INTRAVENOUS CONTINUOUS
Status: DISCONTINUED | OUTPATIENT
Start: 2017-05-11 | End: 2017-05-11 | Stop reason: HOSPADM

## 2017-05-11 RX ORDER — SODIUM CHLORIDE 0.9 % (FLUSH) 0.9 %
3 SYRINGE (ML) INJECTION
Status: DISCONTINUED | OUTPATIENT
Start: 2017-05-11 | End: 2017-05-11 | Stop reason: HOSPADM

## 2017-05-11 RX ORDER — HYDROCODONE BITARTRATE AND ACETAMINOPHEN 5; 325 MG/1; MG/1
1 TABLET ORAL EVERY 6 HOURS PRN
Qty: 15 TABLET | Refills: 0 | Status: SHIPPED | OUTPATIENT
Start: 2017-05-11 | End: 2017-05-24

## 2017-05-11 RX ORDER — HYDROCODONE BITARTRATE AND ACETAMINOPHEN 10; 325 MG/1; MG/1
TABLET ORAL
Status: COMPLETED
Start: 2017-05-11 | End: 2017-05-11

## 2017-05-11 RX ADMIN — HYDROMORPHONE HYDROCHLORIDE 0.2 MG: 1 INJECTION, SOLUTION INTRAMUSCULAR; INTRAVENOUS; SUBCUTANEOUS at 11:05

## 2017-05-11 RX ADMIN — MIDAZOLAM HYDROCHLORIDE 2 MG: 1 INJECTION, SOLUTION INTRAMUSCULAR; INTRAVENOUS at 09:05

## 2017-05-11 RX ADMIN — SODIUM CHLORIDE, SODIUM GLUCONATE, SODIUM ACETATE, POTASSIUM CHLORIDE, MAGNESIUM CHLORIDE, SODIUM PHOSPHATE, DIBASIC, AND POTASSIUM PHOSPHATE: .53; .5; .37; .037; .03; .012; .00082 INJECTION, SOLUTION INTRAVENOUS at 10:05

## 2017-05-11 RX ADMIN — FENTANYL CITRATE 50 MCG: 50 INJECTION, SOLUTION INTRAMUSCULAR; INTRAVENOUS at 09:05

## 2017-05-11 RX ADMIN — PROPOFOL 170 MG: 10 INJECTION, EMULSION INTRAVENOUS at 09:05

## 2017-05-11 RX ADMIN — HYDROMORPHONE HYDROCHLORIDE 0.2 MG: 1 INJECTION, SOLUTION INTRAMUSCULAR; INTRAVENOUS; SUBCUTANEOUS at 12:05

## 2017-05-11 RX ADMIN — SODIUM CHLORIDE: 0.9 INJECTION, SOLUTION INTRAVENOUS at 09:05

## 2017-05-11 RX ADMIN — HYDROCODONE BITARTRATE AND ACETAMINOPHEN 1 TABLET: 10; 325 TABLET ORAL at 11:05

## 2017-05-11 RX ADMIN — HYDROCODONE BITARTRATE AND ACETAMINOPHEN 1 TABLET: 5; 325 TABLET ORAL at 01:05

## 2017-05-11 RX ADMIN — ONDANSETRON 4 MG: 2 INJECTION INTRAMUSCULAR; INTRAVENOUS at 12:05

## 2017-05-11 NOTE — PROGRESS NOTES
Dr Cid has called back and is aware of patient's codiene allergy.  Md ok with vicodin for pain control for patient.

## 2017-05-11 NOTE — ANESTHESIA RELEASE NOTE
Anesthesia Release from PACU Note    Patient: Pradeep Arias    Procedure(s) Performed: Procedure(s) (LRB):  CONIZATION-CERVIX (CKC) (N/A)    Anesthesia type: general    Post pain: Adequate analgesia    Post assessment: no apparent anesthetic complications    Last Vitals:   Visit Vitals    /76    Pulse 69    Temp 36.1 °C (97 °F) (Oral)    Resp 15    LMP 04/30/2017    SpO2 100%    Breastfeeding No       Post vital signs: stable    Level of consciousness: awake, alert  and oriented    Nausea/Vomiting: no nausea/no vomiting    Complications: none    Airway Patency: patent    Respiratory: unassisted    Cardiovascular: stable and blood pressure at baseline    Hydration: euvolemic

## 2017-05-11 NOTE — DISCHARGE INSTRUCTIONS
After a Cone Biopsy     Make sure to keep any follow-up appointments with your healthcare provider.     A cone biopsy is a quick outpatient surgery used to find and treat a problem in the cervix. Your healthcare provider may do a cone biopsy if one or more Pap tests and a microscope (colposcopy) exam showed abnormal cells on your cervix. A cone biopsy takes less than an hour, and youll be able to go home the same day.  During your recovery  After the surgery has been done, youll rest in the recovery area until youre awake and ready to go home. An adult friend or family member will need to drive you home.  · Plan to rest at home for a day or two.  · You may have some bleeding or discharge and mild cramping for a few days after surgery. Use sanitary pads, not tampons, for at least the first month.  · You may be given medicine to relieve any discomfort  · Do not have sexual intercourse or douche for 4 to 6 weeks after your biopsy. If the cervix has not fully healed, the tissue could be injured and then bleed.  · Follow any other instructions your healthcare provider gives you.  Getting your results  Your healthcare provider will get the biopsy results and discuss them with you in about a week. He or she will see you in 3 to 6 weeks to be sure the tissue is healing well.  Call your healthcare provider if you have any of the following after your cone biopsy:  · Heavy bleeding (more than a pad an hour) or blood clots  · Severe stomach pain  · Chills  · Fever over 100.4°F (38°C)   Date Last Reviewed: 4/26/2015  © 2986-9376 [employer]. [address] [place] [address], [place] [address]. All rights reserved. This information is not intended as a substitute for professional medical care. Always follow your healthcare professional's instructions.

## 2017-05-11 NOTE — TRANSFER OF CARE
Anesthesia Transfer of Care Note    Patient: Pradeep Arias    Procedure(s) Performed: Procedure(s) (LRB):  CONIZATION-CERVIX (CKC) (N/A)    Patient location: PACU    Anesthesia Type: general    Transport from OR: Transported from OR on 6-10 L/min O2 by face mask with adequate spontaneous ventilation    Post pain: adequate analgesia    Post assessment: no apparent anesthetic complications and tolerated procedure well    Post vital signs: stable    Level of consciousness: awake, alert and oriented    Nausea/Vomiting: no nausea/vomiting    Complications: none    Transfer of care protocol was followed      Last vitals:   Visit Vitals    LMP 04/30/2017    Breastfeeding No

## 2017-05-11 NOTE — INTERVAL H&P NOTE
The patient has been examined and the H&P has been reviewed:    I concur with the findings and no changes have occurred since H&P was written.    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   Patient confirms her only medication is Ortho Evra.    Surgery risks, benefits and alternative options discussed and understood by patient/family.    Active Hospital Problems    Diagnosis  POA    Cervical dysplasia [N87.9]  Yes      Resolved Hospital Problems    Diagnosis Date Resolved POA   No resolved problems to display.     Anayeli Cid MD  PGY-2 OB/GYN  [phone]

## 2017-05-11 NOTE — PLAN OF CARE
Patient tolerating oral liquids without difficulty. No apparent s&s of distress noted at this time, no complaints voiced at this time.Consents verified and in chart. Discharge instructions reviewed with patient/family/friend with good verbal feedback received. Patient ready for discharge

## 2017-05-11 NOTE — DISCHARGE SUMMARY
[facility]  Brief Operative Note     SUMMARY     Surgery Date: 5/11/2017     Surgeon(s) and Role:     * Merly Green MD - Primary     * Anayeli Cid MD - Resident - Assisting    Pre-op Diagnosis:  Cervical dysplasia [N87.9]    Post-op Diagnosis:  Post-Op Diagnosis Codes:     * Cervical dysplasia [N87.9]    Procedure(s) (LRB):  CONIZATION-CERVIX (CKC) (N/A)    Anesthesia: General/MAC    Description of the findings of the procedure:   1. Lugol's applied to the cervix with the transformation zone visualized.   2. Cold knife cone and endocervical curettage specimens sent to pathology.   3. Hemostasis noted at the conclusion of the procedure.   4. Cervical os patent at the conclusion of the procedure.  5. Cervix pulls well. Patient would be a candidate for vaginal hysterectomy in the future.     Estimated Blood Loss: 20cc         Specimens:   Specimen (12h ago through future)    Start     Ordered    05/11/17 1031  Specimen to Pathology - Surgery  Once     Comments:  Continuation-  2. ECC- permanent- fridge    05/11/17 1031    05/11/17 1019  Specimen to Pathology - Surgery  Once     Comments:  1. Cervix-stitch at 12:00-Perm    05/11/17 1019          Discharge Note    SUMMARY     Admit Date: 5/11/2017    Discharge Date and Time:  05/11/2017 10:40 AM    Hospital Course (synopsis of major diagnoses, care, treatment, and services provided during the course of the hospital stay): Patient presented for scheduled procedure. Patient was passed back to OR for C. Please see OP note for further details. Tolerated procedure well and patient was taken to recovery in a stable condition. Prior to discharge patient was able to void, ambulate, tolerate PO and pain was well controlled with PO meds. Patient was given routine post-op instructions for which patient voiced understanding. Patient was subsequently discharged home.     Final Diagnosis: Post-Op Diagnosis Codes:     * Cervical dysplasia  [N87.9]    Disposition: Home or Self Care    Follow Up/Patient Instructions:     Medications:  Reconciled Home Medications:   Current Discharge Medication List      START taking these medications    Details   hydrocodone-acetaminophen 5-325mg (NORCO) 5-325 mg per tablet Take 1 tablet by mouth every 6 (six) hours as needed for Pain.  Qty: 15 tablet, Refills: 0         CONTINUE these medications which have NOT CHANGED    Details   BIOTIN ORAL Take by mouth.      ibuprofen (ADVIL,MOTRIN) 200 MG tablet Take 200 mg by mouth every 6 (six) hours as needed for Pain.      norelgestromin-ethinyl estradiol (ORTHO EVRA) 150-35 mcg/24 hr Place 1 patch onto the skin once a week. For 3 weeks each month and off one week.  Qty: 3 patch, Refills: 11    Associated Diagnoses: Family planning      prenatal vitamin#7-iron-FA-dha 28-1. mg Cap Take 1 tablet by mouth once daily.  Qty: 30 each, Refills: 10    Associated Diagnoses: Pregnancy with one fetus, third trimester             Discharge Procedure Orders  Diet general     Activity as tolerated     Call MD for:  temperature >100.4     Call MD for:  persistent nausea and vomiting     Call MD for:  severe uncontrolled pain     Call MD for:  difficulty breathing, headache or visual disturbances     Call MD for:  redness, tenderness, or signs of infection (pain, swelling, redness, odor or green/yellow discharge around incision site)     Call MD for:  hives     Call MD for:  persistent dizziness or light-headedness     No dressing needed       Follow-up Information     Follow up with Merly Green MD In 2 weeks.    Specialty:  Gynecologic Oncology    Why:  Postoperative check    Contact information:    [address]  [facility] [address]  [phone]          Anayeli Cid MD  PGY-2 OB/GYN  [phone]

## 2017-05-11 NOTE — ANESTHESIA POSTPROCEDURE EVALUATION
Anesthesia Post Evaluation    Patient: Pradeep Arias    Procedure(s) Performed: Procedure(s) (LRB):  CONIZATION-CERVIX (CKC) (N/A)    Final Anesthesia Type: general  Patient location during evaluation: PACU  Patient participation: Yes- Able to Participate  Level of consciousness: awake and alert  Post-procedure vital signs: reviewed and stable  Pain management: adequate  Airway patency: patent  PONV status at discharge: No PONV  Anesthetic complications: no      Cardiovascular status: blood pressure returned to baseline  Respiratory status: unassisted  Hydration status: euvolemic  Follow-up not needed.        Visit Vitals    /76    Pulse 69    Temp 36.1 °C (97 °F) (Oral)    Resp 15    LMP 04/30/2017    SpO2 100%    Breastfeeding No       Pain/Isrrael Score: Pain Assessment Performed: Yes (5/11/2017 11:42 AM)  Presence of Pain: complains of pain/discomfort (5/11/2017 11:42 AM)  Pain Rating Prior to Med Admin: 3 (5/11/2017 12:08 PM)  Pain Rating Post Med Admin: 4 (5/11/2017 11:16 AM)  Isrrael Score: 10 (5/11/2017 11:16 AM)

## 2017-05-11 NOTE — IP AVS SNAPSHOT
[facility]  [address]  [facility] [address]  Phone: [phone]           Patient Discharge Instructions   Our goal is to set you up for success. This packet includes information on your condition, medications, and your home care.  It will help you care for yourself to prevent having to return to the hospital.     Please ask your nurse if you have any questions.      There are many details to remember when preparing to leave the hospital. Here is what you will need to do:    1. Take your medicine. If you are prescribed medications, review your Medication List on the following pages. You may have new medications to  at the pharmacy and others that you'll need to stop taking. Review the instructions for how and when to take your medications. Talk with your doctor or nurses if you are unsure of what to do.     2. Go to your follow-up appointments. Specific follow-up information is listed in the following pages. Your may be contacted by a nurse or clinical provider about future appointments. Be sure we have all of the phone numbers to reach you. Please contact your provider's office if you are unable to make an appointment.     3. Watch for warning signs. Your doctor or nurse will give you detailed warning signs to watch for and when to call for assistance. These instructions may also include educational information about your condition. If you experience any of warning signs to your health, call your doctor.           [facility]  Unless otherwise directed by your provider, please   contact [facility], our nurse care line   that is available for 24/7 assistance.     [phone] (toll-free)     Registered nurses in the [facility]   provide: appointment scheduling, clinical advisement, health education, and other advisory services.                  ** Verify the list of medication(s) below is accurate and up to date. Carry this with you in case of  emergency. If your medications have changed, please notify your healthcare provider.             Medication List      START taking these medications        Additional Info                      hydrocodone-acetaminophen 5-325mg 5-325 mg per tablet   Commonly known as:  NORCO   Quantity:  15 tablet   Refills:  0   Dose:  1 tablet    Instructions:  Take 1 tablet by mouth every 6 (six) hours as needed for Pain.     Begin Date    AM    Noon    PM    Bedtime         CONTINUE taking these medications        Additional Info                      BIOTIN ORAL   Refills:  0    Instructions:  Take by mouth.     Begin Date    AM    Noon    PM    Bedtime       ibuprofen 200 MG tablet   Commonly known as:  ADVIL,MOTRIN   Refills:  0   Dose:  200 mg    Instructions:  Take 200 mg by mouth every 6 (six) hours as needed for Pain.     Begin Date    AM    Noon    PM    Bedtime       norelgestromin-ethinyl estradiol 150-35 mcg/24 hr   Commonly known as:  ORTHO EVRA   Quantity:  3 patch   Refills:  11   Dose:  1 patch    Instructions:  Place 1 patch onto the skin once a week. For 3 weeks each month and off one week.     Begin Date    AM    Noon    PM    Bedtime       prenatal vit 7-iron-folic-dha 28-1. mg Cap   Quantity:  30 each   Refills:  10   Dose:  1 tablet    Instructions:  Take 1 tablet by mouth once daily.     Begin Date    AM    Noon    PM    Bedtime            Where to Get Your Medications      You can get these medications from any pharmacy     Bring a paper prescription for each of these medications     hydrocodone-acetaminophen 5-325mg 5-325 mg per tablet                  Please bring to all follow up appointments:    1. A copy of your discharge instructions.  2. All medicines you are currently taking in their original bottles.  3. Identification and insurance card.    Please arrive 15 minutes ahead of scheduled appointment time.    Please call 24 hours in advance if you must reschedule your appointment and/or time.         Follow-up Information     Follow up with Merly Green MD In 2 weeks.    Specialty:  Gynecologic Oncology    Why:  Postoperative check    Contact information:    [address]  [facility] [address]  [phone]          Discharge Instructions     Future Orders    Activity as tolerated     Call MD for:  difficulty breathing, headache or visual disturbances     Call MD for:  hives     Call MD for:  persistent dizziness or light-headedness     Call MD for:  persistent nausea and vomiting     Call MD for:  redness, tenderness, or signs of infection (pain, swelling, redness, odor or green/yellow discharge around incision site)     Call MD for:  severe uncontrolled pain     Call MD for:  temperature >100.4     Diet general     Questions:    Total calories:      Fat restriction, if any:      Protein restriction, if any:      Na restriction, if any:      Fluid restriction:      Additional restrictions:      No dressing needed         Discharge Instructions         After a Cone Biopsy     Make sure to keep any follow-up appointments with your healthcare provider.     A cone biopsy is a quick outpatient surgery used to find and treat a problem in the cervix. Your healthcare provider may do a cone biopsy if one or more Pap tests and a microscope (colposcopy) exam showed abnormal cells on your cervix. A cone biopsy takes less than an hour, and youll be able to go home the same day.  During your recovery  After the surgery has been done, youll rest in the recovery area until youre awake and ready to go home. An adult friend or family member will need to drive you home.  · Plan to rest at home for a day or two.  · You may have some bleeding or discharge and mild cramping for a few days after surgery. Use sanitary pads, not tampons, for at least the first month.  · You may be given medicine to relieve any discomfort  · Do not have sexual intercourse or douche for 4 to 6 weeks after your biopsy. If the cervix has not  fully healed, the tissue could be injured and then bleed.  · Follow any other instructions your healthcare provider gives you.  Getting your results  Your healthcare provider will get the biopsy results and discuss them with you in about a week. He or she will see you in 3 to 6 weeks to be sure the tissue is healing well.  Call your healthcare provider if you have any of the following after your cone biopsy:  · Heavy bleeding (more than a pad an hour) or blood clots  · Severe stomach pain  · Chills  · Fever over 100.4°F (38°C)   Date Last Reviewed: 4/26/2015  © 3163-2034 [employer] [address] [place] [address]. All rights reserved. This information is not intended as a substitute for professional medical care. Always follow your healthcare professional's instructions.            Primary Diagnosis     Your primary diagnosis was:  Abnormal Cells Of Cervix      Admission Information     Date & Time Provider Department CSN    5/11/2017  8:34 AM Merly Green MD [facility] [id number]      Care Providers     Provider Role Specialty Primary office phone    Merly Green MD Attending Provider Gynecologic Oncology [phone]    Merly Green MD Surgeon  Gynecologic Oncology [phone]      Your Vitals Were     BP Pulse Temp Resp Last Period SpO2    121/63 66 97.9 °F (36.6 °C) (Tympanic) 16 04/30/2017 98%      Recent Lab Values        1/4/2016                           2:05 PM           A1C 5.4                       Pending Labs     Order Current Status    Specimen to Pathology - Surgery Collected (05/11/17 1019)    Specimen to Pathology - Surgery Collected (05/11/17 1031)      Allergies as of 5/11/2017        Reactions    Codeine Rash      Advance Directives     An advance directive is a document which, in the event you are no longer able to make decisions for yourself, tells your healthcare team what kind of treatment you do or do not want to receive, or who you  would like to make those decisions for you.  If you do not currently have an advance directive, [facility] encourages you to create one.  For more information call:  [phone]), [phone] ([phone]),  or log on to [email or web address].        Language Assistance Services     ATTENTION: Language assistance services are available, free of charge. Please call [phone].      ATENCIÓN: Si habla español, tiene a cruz disposición servicios gratuitos de asistencia lingüística. Llame al [phone].     CHÚ Ý: N?u b?n nói Ti?ng Vi?t, có các d?ch v? h? tr? ngôn ng? mi?n phí dành cho b?n. G?i s? [phone].         [facility] complies with applicable Federal civil rights laws and does not discriminate on the basis of race, color, national origin, age, disability, or sex.

## 2017-05-11 NOTE — H&P (VIEW-ONLY)
Subjective:      Patient ID: Pradeep Arias is a 22 y.o. female.    Chief Complaint: Follow-up (Review Results)      HPI  Patient is a 22yo  who originally presented as a referral from Dr. Hany Carpenter. Pap smear 3/22/17 showed cells suspicious for adenocarcinoma. Dr. Carpenter performed a cervical biopsy 3/31/17. Final pathology shows highly atypical cells after review at [place].       No significant medical history. No abdominal surgeries. Desires future childbearing.     MRI pelvis 17 An actual mass is not seen. The inner and outer cervical stroma is preserved and no parametrial invasion adenopathy bone lesion or hydronephrosis is seen.    She presents today for further treatment planning.     Review of Systems   Constitutional: Negative for appetite change, chills, fatigue and fever.   HENT: Negative for mouth sores.    Respiratory: Negative for cough and shortness of breath.    Cardiovascular: Negative for leg swelling.   Gastrointestinal: Negative for abdominal pain, blood in stool, constipation and diarrhea.   Endocrine: Negative for cold intolerance.   Genitourinary: Negative for dysuria and vaginal bleeding.   Musculoskeletal: Negative for myalgias.   Skin: Negative for rash.   Allergic/Immunologic: Negative.    Neurological: Negative for weakness and numbness.   Hematological: Negative for adenopathy. Does not bruise/bleed easily.   Psychiatric/Behavioral: Negative for confusion.        Objective:   Physical Exam:   Constitutional: She is oriented to person, place, and time. She appears well-developed and well-nourished.    HENT:   Head: Normocephalic and atraumatic.    Eyes: EOM are normal. Pupils are equal, round, and reactive to light.    Neck: Normal range of motion. Neck supple. No thyromegaly present.    Cardiovascular: Normal rate, regular rhythm and intact distal pulses.     Pulmonary/Chest: Effort normal and breath sounds normal. No respiratory distress. She has no wheezes.         Abdominal: Soft. Bowel sounds are normal. She exhibits no distension, no ascites and no mass. There is no tenderness.             Musculoskeletal: Normal range of motion and moves all extremeties.      Lymphadenopathy:     She has no cervical adenopathy.        Right: No supraclavicular adenopathy present.        Left: No supraclavicular adenopathy present.    Neurological: She is alert and oriented to person, place, and time.    Skin: Skin is warm and dry. No rash noted.    Psychiatric: She has a normal mood and affect.       Assessment:     1. Cervical dysplasia        Plan:   No orders of the defined types were placed in this encounter.    The cervical biopsies show highly atypical cells, inconclusive for diagnosis of invasive carcinoma. I have therefore recommended CKC fur diagnostic and potentially therapeutic purposes in this young patient. MRI and CT show no obvious disease. She desires to proceed. The risks, benefits, and indications of the procedure were discussed with the patient and her family members if present.  These included bleeding, transfusion, infection, damage to surrounding tissues (bowel, bladder, ureter), wound separation, perioperative cardiac events, VTE, pneumonia, and possible death.  She voiced understanding, all questions were answered and consents were signed.  1. Plan for CKC 5/11/17  2. Pre op anesthesia

## 2017-05-16 NOTE — OP NOTE
DATE OF PROCEDURE:  05/11/2017.    PREOPERATIVE DIAGNOSIS:  Cervical biopsy showing highly atypical cells.    POSTOPERATIVE DIAGNOSIS:  Cervical biopsy showing highly atypical cells.    PROCEDURES PERFORMED:  Cervical conization and endocervical curettings.    ANESTHESIA:  LMA.    SPECIMENS REMOVED:  1.  Cervix.  2.  Post-conization ECC.    ESTIMATED BLOOD LOSS:  25 mL.    FINDINGS:  There was a small amount of decreased Lugol's uptake at approximately   4-6 o'clock on the face of the cervix.  There were no gross lesions or palpable   findings.    PROCEDURE IN DETAIL:  The patient was taken to the Operating Room.  Informed   consent had been obtained.  She underwent LMA anesthesia without difficulty and   was prepped and draped in the normal sterile fashion in the dorsal lithotomy   position.  Timeout was performed.  All parties agreed to the planned procedure.    Perioperative antibiotics were administered.  A weighted speculum was placed in   the vagina.  Anterior lip of the cervix was identified and grasped with a   single-tooth tenaculum.  Findings were as above.  We placed two stay sutures   with chromic in a figure-of-eight fashion at the 3 and 9 o'clock position of the   cervix.  We then performed a complete cold knife conization of the cervix in a   circumferential fashion.  We then collected a post-conization endocervical   curetting.  The cervical conization bed was then rendered hemostatic with Bovie   cautery.  The cervix was then run circumferentially with the chromic suture in a   running locked fashion for additional hemostasis.  The os was patent at the   conclusion of the procedure and good hemostasis was noted.  The patient   tolerated the procedure well.  Sponge, lap, needle, and instrument counts were   correct x2 as reported by the circulating nurse.      MARKO  dd: 05/15/2017 15:41:27 (CDT)  td: 05/15/2017 19:44:56 (CDT)  Doc ID   #[id number]  Job ID #[id number]    CC:

## 2017-05-22 ENCOUNTER — TELEPHONE (OUTPATIENT)
Dept: GYNECOLOGIC ONCOLOGY | Facility: CLINIC | Age: 22
End: 2017-05-22

## 2017-05-22 NOTE — TELEPHONE ENCOUNTER
Called to review pathology. Stage IA2. I have asked her to come in and review the pathology. She voiced understanding.     Message routed for scheduling.

## 2017-05-24 ENCOUNTER — OFFICE VISIT (OUTPATIENT)
Dept: GYNECOLOGIC ONCOLOGY | Facility: CLINIC | Age: 22
End: 2017-05-24
Attending: OBSTETRICS & GYNECOLOGY
Payer: MEDICAID

## 2017-05-24 VITALS
DIASTOLIC BLOOD PRESSURE: 85 MMHG | HEIGHT: 64 IN | BODY MASS INDEX: 25.36 KG/M2 | HEART RATE: 85 BPM | WEIGHT: 148.56 LBS | SYSTOLIC BLOOD PRESSURE: 116 MMHG

## 2017-05-24 DIAGNOSIS — C53.9 CERVICAL ADENOCARCINOMA: Primary | ICD-10-CM

## 2017-05-24 PROCEDURE — 99999 PR PBB SHADOW E&M-EST. PATIENT-LVL III: CPT | Mod: PBBFAC,,, | Performed by: OBSTETRICS & GYNECOLOGY

## 2017-05-24 PROCEDURE — 99024 POSTOP FOLLOW-UP VISIT: CPT | Mod: S$PBB,,, | Performed by: OBSTETRICS & GYNECOLOGY

## 2017-05-24 PROCEDURE — 99213 OFFICE O/P EST LOW 20 MIN: CPT | Mod: PBBFAC | Performed by: OBSTETRICS & GYNECOLOGY

## 2017-05-24 NOTE — LETTER
June 4, 2017        Hany Carpenter MD  [address]  [address]  [facility] [address]             [facility] - Gynecologic Oncology  [address]  [facility] [address]  Phone: [phone]  Fax: [phone]   Patient: Pradeep Arias   MR Number: [id number]   YOB: 1995   Date of Visit: 5/24/2017       Dear Dr. Carpenter:    Thank you for referring Pradeep Arias to me for evaluation. Below are the relevant portions of my assessment and plan of care.            If you have questions, please do not hesitate to call me. I look forward to following Pradeep along with you.    Sincerely,      Merly Green MD           CC  No Recipients

## 2017-06-04 NOTE — PROGRESS NOTES
Subjective:      Patient ID: Pradeep Arias is a 22 y.o. female.    Chief Complaint: Establish Care (follow from the hosp admission 17)      HPI  Patient is a 22yo  who originally presented as a referral from Dr. Hany Carpenter. Pap smear 3/22/17 showed cells suspicious for adenocarcinoma. Dr. Carpenter performed a cervical biopsy 3/31/17. Final pathology shows highly atypical cells after review at [place].       No significant medical history. No abdominal surgeries. Desires future childbearing.      MRI pelvis 17 An actual mass is not seen. The inner and outer cervical stroma is preserved and no parametrial invasion adenopathy bone lesion or hydronephrosis is seen.    She is s/p CKC 17. 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     She presents today for post op visit and to review these findings. Recovering well from surgery at home.     Review of Systems   Constitutional: Negative for appetite change, chills, fatigue and fever.   HENT: Negative for mouth sores.    Respiratory: Negative for cough and shortness of breath.    Cardiovascular: Negative for leg swelling.   Gastrointestinal: Negative for abdominal pain, blood in stool, constipation and diarrhea.   Endocrine: Negative for cold intolerance.   Genitourinary: Negative for dysuria and vaginal bleeding.   Musculoskeletal: Negative for myalgias.   Skin: Negative for rash.   Allergic/Immunologic: Negative.    Neurological: Negative for weakness and numbness.   Hematological: Negative for adenopathy. Does not bruise/bleed easily.   Psychiatric/Behavioral: Negative for confusion.        Objective:   Physical Exam:   Constitutional: She is oriented to person, place, and time. She appears well-developed and well-nourished.    HENT:   Head: Normocephalic and atraumatic.    Eyes: EOM are normal. Pupils are equal, round, and reactive to light.    Neck: Normal range of motion. Neck supple.  No thyromegaly present.    Cardiovascular: Normal rate, regular rhythm and intact distal pulses.     Pulmonary/Chest: Effort normal and breath sounds normal. No respiratory distress. She has no wheezes.        Abdominal: Soft. Bowel sounds are normal. She exhibits no distension, no ascites and no mass. There is no tenderness.             Musculoskeletal: Normal range of motion and moves all extremeties.      Lymphadenopathy:     She has no cervical adenopathy.        Right: No supraclavicular adenopathy present.        Left: No supraclavicular adenopathy present.    Neurological: She is alert and oriented to person, place, and time.    Skin: Skin is warm and dry. No rash noted.    Psychiatric: She has a normal mood and affect.       Assessment:     1. Cervical adenocarcinoma        Plan:   No orders of the defined types were placed in this encounter.          I had a lengthy and detailed discussion today with the patient and her family who was present with her at today's visit regarding her new diagnosis of cervical cancer. We reviewed the natural history of cervical cancer. We began our discussion regarding treatment options including radical hysterectomy, radical trachelectomy, and ultraconservative treatment with CKC alone. She is a good candidate for conservative treatment given small tumor size <2cm, negative LVSI. Her risk of parametrial involvement from review of historical data would be estimated to be very low at <1% with these tumor features. This ultraconservative approach is currently being investigated on clinical trial [id number].  We also discussed the need for pelvic lymphadenectomy. She strongly desires future fertility and declines hysterectomy. And so I have recommended repeat excision with CKC to try to obtain negative margins and pelvic lymphadenectomy in 6 weeks to allow healing from last excisional procedure. She voiced understanding.

## 2017-06-23 ENCOUNTER — TELEPHONE (OUTPATIENT)
Dept: GYNECOLOGIC ONCOLOGY | Facility: CLINIC | Age: 22
End: 2017-06-23

## 2017-06-23 NOTE — TELEPHONE ENCOUNTER
----- Message from Kinga Tom sent at 6/23/2017  1:04 PM CDT -----  Contact: Pt  Pt would like to discuss upcoming surgery    Pt contact number [phone]  Thanks

## 2017-07-10 ENCOUNTER — TELEPHONE (OUTPATIENT)
Dept: GYNECOLOGIC ONCOLOGY | Facility: CLINIC | Age: 22
End: 2017-07-10

## 2017-07-10 ENCOUNTER — OFFICE VISIT (OUTPATIENT)
Dept: GYNECOLOGIC ONCOLOGY | Facility: CLINIC | Age: 22
End: 2017-07-10
Attending: OBSTETRICS & GYNECOLOGY
Payer: MEDICAID

## 2017-07-10 VITALS
HEART RATE: 60 BPM | BODY MASS INDEX: 24.98 KG/M2 | DIASTOLIC BLOOD PRESSURE: 68 MMHG | WEIGHT: 145.5 LBS | SYSTOLIC BLOOD PRESSURE: 132 MMHG

## 2017-07-10 DIAGNOSIS — C53.9 CERVICAL ADENOCARCINOMA: Primary | ICD-10-CM

## 2017-07-10 PROCEDURE — 99214 OFFICE O/P EST MOD 30 MIN: CPT | Mod: S$PBB,24,, | Performed by: OBSTETRICS & GYNECOLOGY

## 2017-07-10 PROCEDURE — 99999 PR PBB SHADOW E&M-EST. PATIENT-LVL II: CPT | Mod: PBBFAC,,, | Performed by: OBSTETRICS & GYNECOLOGY

## 2017-07-10 PROCEDURE — 99212 OFFICE O/P EST SF 10 MIN: CPT | Mod: PBBFAC | Performed by: OBSTETRICS & GYNECOLOGY

## 2017-07-16 RX ORDER — LIDOCAINE HYDROCHLORIDE 10 MG/ML
1 INJECTION, SOLUTION EPIDURAL; INFILTRATION; INTRACAUDAL; PERINEURAL ONCE
Status: CANCELLED | OUTPATIENT
Start: 2017-07-16 | End: 2017-07-16

## 2017-07-16 RX ORDER — SODIUM CHLORIDE 9 MG/ML
INJECTION, SOLUTION INTRAVENOUS CONTINUOUS
Status: CANCELLED | OUTPATIENT
Start: 2017-07-16

## 2017-07-16 NOTE — PROGRESS NOTES
Subjective:      Patient ID: Pradeep Arias is a 22 y.o. female.    Chief Complaint: Follow-up (8 wk)      HPI  Patient is a 22yo  who originally presented as a referral from Dr. Hany Carpenter. Pap smear 3/22/17 showed cells suspicious for adenocarcinoma. Dr. Carpenter performed a cervical biopsy 3/31/17. Final pathology shows highly atypical cells after review at [place].       No significant medical history. No abdominal surgeries. Desires future childbearing.      MRI pelvis 17 An actual mass is not seen. The inner and outer cervical stroma is preserved and no parametrial invasion adenopathy bone lesion or hydronephrosis is seen.     She is s/p CKC 17. 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      We have discussed treatment options including radical hysterectomy, radical trachelectomy, and ultraconservative treatment with CKC alone. She is a candidate for conservative treatment given small tumor size <2cm, negative LVSI. Her risk of parametrial involvement from review of historical data would be estimated to be low at <1% with these tumor features. This ultraconservative approach is currently being investigated on clinical trial [id number].  We also discussed the need for pelvic lymphadenectomy. She strongly desires future fertility and declines hysterectomy and is aware that radical hysterectomy is the standard of care.     As such I have recommended repeat excision with CKC to try to obtain negative margins and pelvic lymphadenectomy. She presents today for surgery planning.      iReview of Systems   Constitutional: Negative for appetite change, chills, fatigue and fever.   HENT: Negative for mouth sores.    Respiratory: Negative for cough and shortness of breath.    Cardiovascular: Negative for leg swelling.   Gastrointestinal: Negative for abdominal pain, blood in stool, constipation and diarrhea.   Endocrine: Negative for cold  intolerance.   Genitourinary: Negative for dysuria and vaginal bleeding.   Musculoskeletal: Negative for myalgias.   Skin: Negative for rash.   Allergic/Immunologic: Negative.    Neurological: Negative for weakness and numbness.   Hematological: Negative for adenopathy. Does not bruise/bleed easily.   Psychiatric/Behavioral: Negative for confusion.        Objective:   Physical Exam:   Constitutional: She is oriented to person, place, and time. She appears well-developed and well-nourished.    HENT:   Head: Normocephalic and atraumatic.    Eyes: EOM are normal. Pupils are equal, round, and reactive to light.    Neck: Normal range of motion. Neck supple. No thyromegaly present.    Cardiovascular: Normal rate, regular rhythm and intact distal pulses.     Pulmonary/Chest: Effort normal and breath sounds normal. No respiratory distress. She has no wheezes.        Abdominal: Soft. Bowel sounds are normal. She exhibits no distension, no ascites and no mass. There is no tenderness.             Musculoskeletal: Normal range of motion and moves all extremeties.      Lymphadenopathy:     She has no cervical adenopathy.        Right: No supraclavicular adenopathy present.        Left: No supraclavicular adenopathy present.    Neurological: She is alert and oriented to person, place, and time.    Skin: Skin is warm and dry. No rash noted.    Psychiatric: She has a normal mood and affect.       Assessment:     1. Cervical adenocarcinoma        Plan:   No orders of the defined types were placed in this encounter.    Plan for CKC and robotic pelvic lymphadenectomy 7/28/17. The risks, benefits, and indications of the procedure were discussed with the patient and her family members if present.  These included bleeding, transfusion, infection, damage to surrounding tissues (bowel, bladder, ureter), wound separation, conversion to laparotomy, lymphedema, perioperative cardiac events, VTE, pneumonia, and possible death.  She voiced  understanding, all questions were answered and consents were signed.

## 2017-07-17 ENCOUNTER — TELEPHONE (OUTPATIENT)
Dept: GYNECOLOGIC ONCOLOGY | Facility: CLINIC | Age: 22
End: 2017-07-17

## 2017-07-17 NOTE — TELEPHONE ENCOUNTER
----- Message from Jennifer Olivo sent at 7/17/2017 10:22 AM CDT -----  Pradeep Arias needs to speak with medical assistant/pt can be reached at [phone]       [facility]# [id number]

## 2017-07-19 ENCOUNTER — ANESTHESIA EVENT (OUTPATIENT)
Dept: SURGERY | Facility: OTHER | Age: 22
End: 2017-07-19
Payer: MEDICAID

## 2017-07-19 ENCOUNTER — HOSPITAL ENCOUNTER (OUTPATIENT)
Dept: PREADMISSION TESTING | Facility: OTHER | Age: 22
Discharge: HOME | End: 2017-07-19
Attending: OBSTETRICS & GYNECOLOGY
Payer: MEDICAID

## 2017-07-19 VITALS
SYSTOLIC BLOOD PRESSURE: 120 MMHG | HEIGHT: 65 IN | DIASTOLIC BLOOD PRESSURE: 74 MMHG | HEART RATE: 70 BPM | RESPIRATION RATE: 16 BRPM | OXYGEN SATURATION: 99 % | TEMPERATURE: 99 F | WEIGHT: 144 LBS | BODY MASS INDEX: 23.99 KG/M2

## 2017-07-19 RX ORDER — SODIUM CHLORIDE, SODIUM LACTATE, POTASSIUM CHLORIDE, CALCIUM CHLORIDE 600; 310; 30; 20 MG/100ML; MG/100ML; MG/100ML; MG/100ML
INJECTION, SOLUTION INTRAVENOUS CONTINUOUS
Status: CANCELLED | OUTPATIENT
Start: 2017-07-19

## 2017-07-19 RX ORDER — FAMOTIDINE 20 MG/1
20 TABLET, FILM COATED ORAL
Status: CANCELLED | OUTPATIENT
Start: 2017-07-19 | End: 2017-07-19

## 2017-07-19 RX ORDER — IBUPROFEN 200 MG
200 TABLET ORAL EVERY 6 HOURS PRN
COMMUNITY
End: 2018-06-19 | Stop reason: ALTCHOICE

## 2017-07-19 NOTE — ANESTHESIA PREPROCEDURE EVALUATION
07/19/2017  Pradeep Arias is a 22 y.o., female.    Anesthesia Evaluation    I have reviewed the Patient Summary Reports.    I have reviewed the Nursing Notes.   I have reviewed the Medications.     Review of Systems  Anesthesia Hx:  No problems with previous Anesthesia  Denies Family Hx of Anesthesia complications.   Denies Personal Hx of Anesthesia complications.   Social:  Non-Smoker    Hematology/Oncology:  Hematology Normal      Current/Recent Cancer. Oncology Comments: Recently diagnosed cervical cancer    EENT/Dental:EENT/Dental Normal   Cardiovascular:  Cardiovascular Normal Exercise tolerance: good     Pulmonary:  Pulmonary Normal    Renal/:  Renal/ Normal     Hepatic/GI:  Hepatic/GI Normal    Musculoskeletal:  Musculoskeletal Normal    Neurological:  Neurology Normal    Endocrine:  Endocrine Normal    Dermatological:  Skin Normal    Psych:  Psychiatric Normal           Physical Exam  General:  Well nourished    Airway/Jaw/Neck:  Airway Findings: Mouth Opening: Normal General Airway Assessment: Adult  Mallampati: I  Jaw/Neck Findings:      Dental:  Dental Findings: In tact             Anesthesia Plan  Type of Anesthesia, risks & benefits discussed:  Anesthesia Type:  general  Patient's Preference:   Intra-op Monitoring Plan: standard ASA monitors  Intra-op Monitoring Plan Comments:   Post Op Pain Control Plan: multimodal analgesia  Post Op Pain Control Plan Comments:   Induction:   IV  Beta Blocker:         Informed Consent: Patient understands risks and agrees with Anesthesia plan.  Questions answered. Anesthesia consent signed with patient.  ASA Score: 2     Day of Surgery Review of History & Physical:    H&P update referred to the surgeon.         Ready For Surgery From Anesthesia Perspective.

## 2017-07-19 NOTE — DISCHARGE INSTRUCTIONS
PRE-ADMIT TESTING -  [phone]    [address]  [facility]        OUTPATIENT SURGERY UNIT - [phone]    Your surgery has been scheduled at [facility]. We are pleased to have the opportunity to serve you. For Further Information please call [phone].    On the day of surgery please report to the Information Desk on the 1st floor.    · CONTACT YOUR PHYSICIAN'S OFFICE THE DAY PRIOR TO YOUR SURGERY TO OBTAIN YOUR ARRIVAL TIME.     · The evening before surgery do not eat anything after 9 p.m. ( this includes hard candy, chewing gum and mints).  You may only have GATORADE, POWERADE AND WATER  from 9 p.m. until you leave your home.   DO NOT DRINK ANY LIQUIDS ON THE WAY TO THE HOSPITAL.      SPECIAL MEDICATION INSTRUCTIONS: TAKE medications checked off by the Anesthesiologist on your Medication List.    Angiogram Patients: Take medications as instructed by your physician, including aspirin.     Surgery Patients:    If you take ASPIRIN - Your PHYSICIAN/SURGEON will need to inform you IF/OR when you need to stop taking aspirin prior to your surgery.     Do Not take any medications containing IBUPROFEN.  Do Not Wear any make-up or dark nail polish   (especially eye make-up) to surgery. If you come to surgery with makeup on you will be required to remove the makeup or nail polish.    Do not shave your surgical area at least 5 days prior to your surgery. The surgical prep will be performed at the hospital according to Infection Control regulations.    Leave all valuables at home.   Do Not wear any jewelry or watches, including any metal in body piercings.  Contact Lens must be removed before surgery. Either do not wear the contact lens or bring a case and solution for storage.  Please bring a container for eyeglasses or dentures as required.  Bring any paperwork your physician has provided, such as consent forms,  history and physicals, doctor's orders, etc.   Bring comfortable clothes  that are loose fitting to wear upon discharge. Take into consideration the type of surgery being performed.  Maintain your diet as advised per your physician the day prior to surgery.      Adequate rest the night before surgery is advised.   Park in the Parking lot behind the hospital or in the [facility] across the street from the parking lot. Parking is complimentary.  If you will be discharged the same day as your procedure, please arrange for a responsible adult to drive you home or to accompany you if traveling by taxi.   YOU WILL NOT BE PERMITTED TO DRIVE OR TO LEAVE THE HOSPITAL ALONE AFTER SURGERY.   It is strongly recommended that you arrange for someone to remain with you for the first 24 hrs following your surgery.       Thank you for your cooperation.  The Staff of [facility].        Bathing Instructions                                                                 Please shower the evening before and morning of your procedure with    ANTIBACTERIAL SOAP. ( DIAL, etc )  Concentrate on the surgical area   for at least 3 minutes and rinse completely. Dry off as usual.   Do not use any deodorant, powder, body lotions, perfume, after shave or    cologne.

## 2017-07-27 ENCOUNTER — TELEPHONE (OUTPATIENT)
Dept: GYNECOLOGIC ONCOLOGY | Facility: CLINIC | Age: 22
End: 2017-07-27

## 2017-07-27 NOTE — TELEPHONE ENCOUNTER
----- Message from Ana Kitchen sent at 7/27/2017  8:33 AM CDT -----  Contact: self  Pt needing a call back, regarding her surgery time tmrw, (report time), she can be reached at [phone].

## 2017-07-28 ENCOUNTER — HOSPITAL ENCOUNTER (OUTPATIENT)
Facility: OTHER | Age: 22
Discharge: HOME | End: 2017-07-28
Attending: OBSTETRICS & GYNECOLOGY | Admitting: OBSTETRICS & GYNECOLOGY
Payer: MEDICAID

## 2017-07-28 ENCOUNTER — SURGERY (OUTPATIENT)
Age: 22
End: 2017-07-28

## 2017-07-28 ENCOUNTER — ANESTHESIA (OUTPATIENT)
Dept: SURGERY | Facility: OTHER | Age: 22
End: 2017-07-28
Payer: MEDICAID

## 2017-07-28 VITALS
BODY MASS INDEX: 23.99 KG/M2 | TEMPERATURE: 97 F | RESPIRATION RATE: 100 BRPM | HEIGHT: 65 IN | HEART RATE: 68 BPM | SYSTOLIC BLOOD PRESSURE: 118 MMHG | DIASTOLIC BLOOD PRESSURE: 72 MMHG | WEIGHT: 144 LBS | OXYGEN SATURATION: 100 %

## 2017-07-28 DIAGNOSIS — Z98.890 S/P CONIZATION OF CERVIX: Primary | ICD-10-CM

## 2017-07-28 DIAGNOSIS — C53.9 CERVICAL ADENOCARCINOMA: ICD-10-CM

## 2017-07-28 LAB
B-HCG UR QL: NEGATIVE
CTP QC/QA: YES

## 2017-07-28 PROCEDURE — 36000711: Performed by: OBSTETRICS & GYNECOLOGY

## 2017-07-28 PROCEDURE — 71000033 HC RECOVERY, INTIAL HOUR: Performed by: OBSTETRICS & GYNECOLOGY

## 2017-07-28 PROCEDURE — 27201423 OPTIME MED/SURG SUP & DEVICES STERILE SUPPLY: Performed by: OBSTETRICS & GYNECOLOGY

## 2017-07-28 PROCEDURE — 38571 LAPAROSCOPY LYMPHADENECTOMY: CPT | Mod: AS,,, | Performed by: NURSE PRACTITIONER

## 2017-07-28 PROCEDURE — 25000003 PHARM REV CODE 250: Performed by: OBSTETRICS & GYNECOLOGY

## 2017-07-28 PROCEDURE — 88307 TISSUE EXAM BY PATHOLOGIST: CPT | Mod: 26,,, | Performed by: PATHOLOGY

## 2017-07-28 PROCEDURE — 88307 TISSUE EXAM BY PATHOLOGIST: CPT | Performed by: PATHOLOGY

## 2017-07-28 PROCEDURE — 71000039 HC RECOVERY, EACH ADD'L HOUR: Performed by: OBSTETRICS & GYNECOLOGY

## 2017-07-28 PROCEDURE — 25000003 PHARM REV CODE 250: Performed by: ANESTHESIOLOGY

## 2017-07-28 PROCEDURE — 71000015 HC POSTOP RECOV 1ST HR: Performed by: OBSTETRICS & GYNECOLOGY

## 2017-07-28 PROCEDURE — 25000003 PHARM REV CODE 250: Performed by: NURSE ANESTHETIST, CERTIFIED REGISTERED

## 2017-07-28 PROCEDURE — 37000008 HC ANESTHESIA 1ST 15 MINUTES: Performed by: OBSTETRICS & GYNECOLOGY

## 2017-07-28 PROCEDURE — 58570 TLH UTERUS 250 G OR LESS: CPT | Mod: 59,,, | Performed by: OBSTETRICS & GYNECOLOGY

## 2017-07-28 PROCEDURE — 38571 LAPAROSCOPY LYMPHADENECTOMY: CPT | Mod: ,,, | Performed by: OBSTETRICS & GYNECOLOGY

## 2017-07-28 PROCEDURE — 63600175 PHARM REV CODE 636 W HCPCS: Performed by: OBSTETRICS & GYNECOLOGY

## 2017-07-28 PROCEDURE — 63600175 PHARM REV CODE 636 W HCPCS: Performed by: NURSE ANESTHETIST, CERTIFIED REGISTERED

## 2017-07-28 PROCEDURE — 36000710: Performed by: OBSTETRICS & GYNECOLOGY

## 2017-07-28 PROCEDURE — 63600175 PHARM REV CODE 636 W HCPCS: Performed by: ANESTHESIOLOGY

## 2017-07-28 PROCEDURE — 81025 URINE PREGNANCY TEST: CPT | Performed by: ANESTHESIOLOGY

## 2017-07-28 PROCEDURE — 37000009 HC ANESTHESIA EA ADD 15 MINS: Performed by: OBSTETRICS & GYNECOLOGY

## 2017-07-28 PROCEDURE — 88342 IMHCHEM/IMCYTCHM 1ST ANTB: CPT | Mod: 26,,, | Performed by: PATHOLOGY

## 2017-07-28 PROCEDURE — 71000016 HC POSTOP RECOV ADDL HR: Performed by: OBSTETRICS & GYNECOLOGY

## 2017-07-28 RX ORDER — IBUPROFEN 400 MG/1
800 TABLET ORAL 4 TIMES DAILY
Status: DISCONTINUED | OUTPATIENT
Start: 2017-07-28 | End: 2017-07-29 | Stop reason: HOSPADM

## 2017-07-28 RX ORDER — FENTANYL CITRATE 50 UG/ML
25 INJECTION, SOLUTION INTRAMUSCULAR; INTRAVENOUS EVERY 5 MIN PRN
Status: DISCONTINUED | OUTPATIENT
Start: 2017-07-28 | End: 2017-07-29 | Stop reason: HOSPADM

## 2017-07-28 RX ORDER — GLYCOPYRROLATE 0.2 MG/ML
INJECTION INTRAMUSCULAR; INTRAVENOUS
Status: DISCONTINUED | OUTPATIENT
Start: 2017-07-28 | End: 2017-07-28

## 2017-07-28 RX ORDER — SODIUM CHLORIDE 9 MG/ML
INJECTION, SOLUTION INTRAVENOUS CONTINUOUS
Status: DISCONTINUED | OUTPATIENT
Start: 2017-07-28 | End: 2017-07-29 | Stop reason: HOSPADM

## 2017-07-28 RX ORDER — ONDANSETRON 2 MG/ML
INJECTION INTRAMUSCULAR; INTRAVENOUS
Status: DISCONTINUED | OUTPATIENT
Start: 2017-07-28 | End: 2017-07-28

## 2017-07-28 RX ORDER — ROCURONIUM BROMIDE 10 MG/ML
INJECTION, SOLUTION INTRAVENOUS
Status: DISCONTINUED | OUTPATIENT
Start: 2017-07-28 | End: 2017-07-28

## 2017-07-28 RX ORDER — SODIUM CHLORIDE, SODIUM LACTATE, POTASSIUM CHLORIDE, CALCIUM CHLORIDE 600; 310; 30; 20 MG/100ML; MG/100ML; MG/100ML; MG/100ML
INJECTION, SOLUTION INTRAVENOUS CONTINUOUS
Status: DISCONTINUED | OUTPATIENT
Start: 2017-07-28 | End: 2017-07-29 | Stop reason: HOSPADM

## 2017-07-28 RX ORDER — FENTANYL CITRATE 50 UG/ML
INJECTION, SOLUTION INTRAMUSCULAR; INTRAVENOUS
Status: DISCONTINUED | OUTPATIENT
Start: 2017-07-28 | End: 2017-07-28

## 2017-07-28 RX ORDER — NEOSTIGMINE METHYLSULFATE 1 MG/ML
INJECTION, SOLUTION INTRAVENOUS
Status: DISCONTINUED | OUTPATIENT
Start: 2017-07-28 | End: 2017-07-28

## 2017-07-28 RX ORDER — PROPOFOL 10 MG/ML
VIAL (ML) INTRAVENOUS
Status: DISCONTINUED | OUTPATIENT
Start: 2017-07-28 | End: 2017-07-28

## 2017-07-28 RX ORDER — SODIUM CHLORIDE, SODIUM LACTATE, POTASSIUM CHLORIDE, CALCIUM CHLORIDE 600; 310; 30; 20 MG/100ML; MG/100ML; MG/100ML; MG/100ML
INJECTION, SOLUTION INTRAVENOUS CONTINUOUS PRN
Status: DISCONTINUED | OUTPATIENT
Start: 2017-07-28 | End: 2017-07-28

## 2017-07-28 RX ORDER — HYDROMORPHONE HYDROCHLORIDE 2 MG/ML
0.4 INJECTION, SOLUTION INTRAMUSCULAR; INTRAVENOUS; SUBCUTANEOUS EVERY 5 MIN PRN
Status: DISCONTINUED | OUTPATIENT
Start: 2017-07-28 | End: 2017-07-29 | Stop reason: HOSPADM

## 2017-07-28 RX ORDER — LIDOCAINE HYDROCHLORIDE 10 MG/ML
1 INJECTION, SOLUTION EPIDURAL; INFILTRATION; INTRACAUDAL; PERINEURAL ONCE
Status: DISCONTINUED | OUTPATIENT
Start: 2017-07-28 | End: 2017-07-29 | Stop reason: HOSPADM

## 2017-07-28 RX ORDER — KETOROLAC TROMETHAMINE 30 MG/ML
INJECTION, SOLUTION INTRAMUSCULAR; INTRAVENOUS
Status: DISCONTINUED | OUTPATIENT
Start: 2017-07-28 | End: 2017-07-28

## 2017-07-28 RX ORDER — HYDROCODONE BITARTRATE AND ACETAMINOPHEN 5; 325 MG/1; MG/1
1 TABLET ORAL EVERY 4 HOURS PRN
Qty: 40 TABLET | Refills: 0 | Status: SHIPPED | OUTPATIENT
Start: 2017-07-28 | End: 2020-05-19

## 2017-07-28 RX ORDER — OXYCODONE HYDROCHLORIDE 5 MG/1
5 TABLET ORAL
Status: DISCONTINUED | OUTPATIENT
Start: 2017-07-28 | End: 2017-07-29 | Stop reason: HOSPADM

## 2017-07-28 RX ORDER — DEXAMETHASONE SODIUM PHOSPHATE 4 MG/ML
INJECTION, SOLUTION INTRA-ARTICULAR; INTRALESIONAL; INTRAMUSCULAR; INTRAVENOUS; SOFT TISSUE
Status: DISCONTINUED | OUTPATIENT
Start: 2017-07-28 | End: 2017-07-28

## 2017-07-28 RX ORDER — DEXTROSE MONOHYDRATE, SODIUM CHLORIDE, AND POTASSIUM CHLORIDE 50; 1.49; 4.5 G/1000ML; G/1000ML; G/1000ML
INJECTION, SOLUTION INTRAVENOUS CONTINUOUS
Status: DISCONTINUED | OUTPATIENT
Start: 2017-07-28 | End: 2017-07-29 | Stop reason: HOSPADM

## 2017-07-28 RX ORDER — HYDROMORPHONE HYDROCHLORIDE 2 MG/ML
0.5 INJECTION, SOLUTION INTRAMUSCULAR; INTRAVENOUS; SUBCUTANEOUS
Status: DISCONTINUED | OUTPATIENT
Start: 2017-07-28 | End: 2017-07-29 | Stop reason: HOSPADM

## 2017-07-28 RX ORDER — ONDANSETRON 2 MG/ML
4 INJECTION INTRAMUSCULAR; INTRAVENOUS EVERY 8 HOURS PRN
Status: DISCONTINUED | OUTPATIENT
Start: 2017-07-28 | End: 2017-07-29 | Stop reason: HOSPADM

## 2017-07-28 RX ORDER — FAMOTIDINE 20 MG/1
20 TABLET, FILM COATED ORAL
Status: COMPLETED | OUTPATIENT
Start: 2017-07-28 | End: 2017-07-28

## 2017-07-28 RX ORDER — SODIUM CHLORIDE 0.9 % (FLUSH) 0.9 %
3 SYRINGE (ML) INJECTION
Status: DISCONTINUED | OUTPATIENT
Start: 2017-07-28 | End: 2017-07-29 | Stop reason: HOSPADM

## 2017-07-28 RX ORDER — MIDAZOLAM HYDROCHLORIDE 1 MG/ML
INJECTION INTRAMUSCULAR; INTRAVENOUS
Status: DISCONTINUED | OUTPATIENT
Start: 2017-07-28 | End: 2017-07-28

## 2017-07-28 RX ORDER — HYDROCODONE BITARTRATE AND ACETAMINOPHEN 5; 325 MG/1; MG/1
1 TABLET ORAL EVERY 4 HOURS PRN
Status: DISCONTINUED | OUTPATIENT
Start: 2017-07-28 | End: 2017-07-29 | Stop reason: HOSPADM

## 2017-07-28 RX ORDER — PROMETHAZINE HYDROCHLORIDE 25 MG/1
25 TABLET ORAL EVERY 6 HOURS PRN
Status: DISCONTINUED | OUTPATIENT
Start: 2017-07-28 | End: 2017-07-29 | Stop reason: HOSPADM

## 2017-07-28 RX ORDER — CEFAZOLIN SODIUM 2 G/50ML
2 SOLUTION INTRAVENOUS
Status: COMPLETED | OUTPATIENT
Start: 2017-07-28 | End: 2017-07-28

## 2017-07-28 RX ORDER — IBUPROFEN 800 MG/1
800 TABLET ORAL 3 TIMES DAILY
Qty: 40 TABLET | Refills: 0 | Status: SHIPPED | OUTPATIENT
Start: 2017-07-28 | End: 2018-06-19 | Stop reason: ALTCHOICE

## 2017-07-28 RX ORDER — ACETAMINOPHEN 10 MG/ML
INJECTION, SOLUTION INTRAVENOUS
Status: DISCONTINUED | OUTPATIENT
Start: 2017-07-28 | End: 2017-07-28

## 2017-07-28 RX ORDER — GENTAMICIN SULFATE 0.3 %
OINTMENT (GRAM) OPHTHALMIC (EYE) 3 TIMES DAILY
Status: DISCONTINUED | OUTPATIENT
Start: 2017-07-28 | End: 2017-07-29 | Stop reason: HOSPADM

## 2017-07-28 RX ORDER — MEPERIDINE HYDROCHLORIDE 50 MG/ML
12.5 INJECTION INTRAMUSCULAR; INTRAVENOUS; SUBCUTANEOUS ONCE AS NEEDED
Status: ACTIVE | OUTPATIENT
Start: 2017-07-28 | End: 2017-07-28

## 2017-07-28 RX ORDER — FAMOTIDINE 20 MG/1
20 TABLET, FILM COATED ORAL
Status: DISCONTINUED | OUTPATIENT
Start: 2017-07-28 | End: 2017-07-28 | Stop reason: SDUPTHER

## 2017-07-28 RX ADMIN — CEFAZOLIN SODIUM 2 G: 2 SOLUTION INTRAVENOUS at 07:07

## 2017-07-28 RX ADMIN — PROPOFOL 150 MG: 10 INJECTION, EMULSION INTRAVENOUS at 07:07

## 2017-07-28 RX ADMIN — FENTANYL CITRATE 25 MCG: 50 INJECTION INTRAMUSCULAR; INTRAVENOUS at 11:07

## 2017-07-28 RX ADMIN — FAMOTIDINE 20 MG: 20 TABLET, FILM COATED ORAL at 06:07

## 2017-07-28 RX ADMIN — KETOROLAC TROMETHAMINE 30 MG: 30 INJECTION, SOLUTION INTRAMUSCULAR; INTRAVENOUS at 10:07

## 2017-07-28 RX ADMIN — CARBOXYMETHYLCELLULOSE SODIUM 2 DROP: 2.5 SOLUTION/ DROPS OPHTHALMIC at 07:07

## 2017-07-28 RX ADMIN — SODIUM CHLORIDE, SODIUM LACTATE, POTASSIUM CHLORIDE, AND CALCIUM CHLORIDE: 600; 310; 30; 20 INJECTION, SOLUTION INTRAVENOUS at 06:07

## 2017-07-28 RX ADMIN — GENTAMICIN SULFATE: 3 OINTMENT OPHTHALMIC at 01:07

## 2017-07-28 RX ADMIN — MIDAZOLAM HYDROCHLORIDE 2 MG: 1 INJECTION, SOLUTION INTRAMUSCULAR; INTRAVENOUS at 06:07

## 2017-07-28 RX ADMIN — DEXAMETHASONE SODIUM PHOSPHATE 8 MG: 4 INJECTION, SOLUTION INTRAMUSCULAR; INTRAVENOUS at 09:07

## 2017-07-28 RX ADMIN — FENTANYL CITRATE 100 MCG: 50 INJECTION, SOLUTION INTRAMUSCULAR; INTRAVENOUS at 10:07

## 2017-07-28 RX ADMIN — NEOSTIGMINE METHYLSULFATE 5 MG: 1 INJECTION INTRAVENOUS at 10:07

## 2017-07-28 RX ADMIN — ROCURONIUM BROMIDE 35 MG: 10 INJECTION, SOLUTION INTRAVENOUS at 07:07

## 2017-07-28 RX ADMIN — ONDANSETRON 4 MG: 2 INJECTION INTRAMUSCULAR; INTRAVENOUS at 10:07

## 2017-07-28 RX ADMIN — OXYCODONE HYDROCHLORIDE 5 MG: 5 TABLET ORAL at 11:07

## 2017-07-28 RX ADMIN — ROCURONIUM BROMIDE 15 MG: 10 INJECTION, SOLUTION INTRAVENOUS at 08:07

## 2017-07-28 RX ADMIN — PROPOFOL 30 MG: 10 INJECTION, EMULSION INTRAVENOUS at 08:07

## 2017-07-28 RX ADMIN — FERRIC SUBSULFATE 2 ML: 259 SOLUTION TOPICAL at 10:07

## 2017-07-28 RX ADMIN — GLYCOPYRROLATE 0.8 MG: 0.2 INJECTION, SOLUTION INTRAMUSCULAR; INTRAVENOUS at 10:07

## 2017-07-28 RX ADMIN — ACETAMINOPHEN 1000 MG: 10 INJECTION, SOLUTION INTRAVENOUS at 09:07

## 2017-07-28 RX ADMIN — FENTANYL CITRATE 100 MCG: 50 INJECTION, SOLUTION INTRAMUSCULAR; INTRAVENOUS at 07:07

## 2017-07-28 NOTE — DISCHARGE INSTRUCTIONS
Abdominal Laparoscopy Discharge Instructions      Activity  · Limit your activity for 4-6 weeks.  · Dont lift anything heavier than 5-10 pounds.  · Avoid strenuous activities, such as mowing the lawn, vacuuming, or playing sports.  · Limit your activity to short, slow walks. Gradually increase your pace and distance as you feel able.  · Listen to your body. If an activity causes pain, stop.  · Rest when you are tired.  · Dont have sexual intercourse or use tampons or douches until your doctor says its safe to do so.    Home Care  · Always keep your incision clean and dry.  · Shower as instructed. You may wash your incision gently with mild soap and warm water and pat dry  · If you have steri strips they should fall off in 7-10 days.    · If you have band aids covering your incisions change daily or when soiled.  The band aids may be removed post op day 1 if they are clean and dry.  · Take your medication exactly as instructed by your doctor.  · Return to your diet as you feel able. Eat a healthy, well-balanced diet.  · Avoid constipation.  ¨ Use laxatives, stool softeners, or enemas as directed by your doctor.  ¨ Eat more high-fiber foods.  ¨ Drink 6-8 glasses of water every day, unless directed otherwise.  Follow-Up  Make a follow-up appointment as directed by our staff.       When to Call Your Doctor  Call your doctor right away if you have any of the following:  · Fever above 101.5°F  (38.6°C) or chills  · Bright red vaginal bleeding or a foul-smelling discharge  · Vaginal bleeding that soaks more than one sanitary pad per hour  · Trouble urinating or burning sensation when you urinate  · Severe abdominal pain or bloating  · Redness, swelling, or drainage at your incision site  · Shortness of breath        ________________________________________________________________  FOR EMERGENCIES:  If any unusual problems or difficulties occur, contact                 ________________________ or the resident at  [phone] (page ) or at the Clinic office, [phone].      After a Cone Biopsy     Make sure to keep any follow-up appointments with your healthcare provider.     A cone biopsy is a quick outpatient surgery used to find and treat a problem in the cervix. Your healthcare provider may do a cone biopsy if one or more Pap tests and a microscope (colposcopy) exam showed abnormal cells on your cervix. A cone biopsy takes less than an hour, and youll be able to go home the same day.    During your recovery  After the surgery has been done, youll rest in the recovery area until youre awake and ready to go home. An adult friend or family member will need to drive you home.  · Plan to rest at home for a day or two.  · You may have some bleeding or discharge and mild cramping for a few days after surgery. Use sanitary pads, not tampons, for at least the first month.  · You may be given medicine to relieve any discomfort  · Do not have sexual intercourse or douche for 4 to 6 weeks after your biopsy. If the cervix has not fully healed, the tissue could be injured and then bleed.  · Follow any other instructions your healthcare provider gives you.    Getting your results  Your healthcare provider will get the biopsy results and discuss them with you in about a week. He or she will see you in 3 to 6 weeks to be sure the tissue is healing well.    Call your healthcare provider if you have any of the following after your cone biopsy:    · Heavy bleeding (more than a pad an hour) or blood clots  · Severe stomach pain  · Chills  · Fever over 100.4°F (38°C)         Discharge Instructions: After Your Surgery  Youve just had surgery. During surgery you were given medicine called anesthesia to keep you relaxed and free of pain. After surgery you may have some pain or nausea. This is common. Here are some tips for feeling better and getting well after surgery.     Stay on schedule with your medication.     Going  home  Your doctor or nurse will show you how to take care of yourself when you go home. He or she will also answer your questions. Have an adult family member or friend drive you home. For the first 24 hours after your surgery:    · Do not drive or use heavy equipment.  · Do not make important decisions or sign legal papers.  · Do not drink alcohol.  · Have someone stay with you, if needed. He or she can watch for problems and help keep you safe.    Be sure to go to all follow-up visits with your doctor. And rest after your surgery for as long as your doctor tells you to.    Coping with pain  If you have pain after surgery, pain medicine will help you feel better. Take it as told, before pain becomes severe. Also, ask your doctor or pharmacist about other ways to control pain. This might be with heat, ice, or relaxation. And follow any other instructions your surgeon or nurse gives you.    Tips for taking pain medicine  To get the best relief possible, remember these points:    · Pain medicines can upset your stomach. Taking them with a little food may help.  · Most pain relievers taken by mouth need at least 20 to 30 minutes to start to work.  · Taking medicine on a schedule can help you remember to take it. Try to time your medicine so that you can take it before starting an activity. This might be before you get dressed, go for a walk, or sit down for dinner.  · Constipation is a common side effect of pain medicines. Call your doctor before taking any medicines such as laxatives or stool softeners to help ease constipation. Also ask if you should skip any foods. Drinking lots of fluids and eating foods such as fruits and vegetables that are high in fiber can also help. Remember, do not take laxatives unless your surgeon has prescribed them.  · Drinking alcohol and taking pain medicine can cause dizziness and slow your breathing. It can even be deadly. Do not drink alcohol while taking pain medicine.  · Pain  medicine can make you react more slowly to things. Do not drive or run machinery while taking pain medicine.    Your health care provider may tell you to take acetaminophen to help ease your pain. Ask him or her how much you are supposed to take each day. Acetaminophen or other pain relievers may interact with your prescription medicines or other over-the-counter (OTC) drugs. Some prescription medicines have acetaminophen and other ingredients. Using both prescription and OTC acetaminophen for pain can cause you to overdose. Read the labels on your OTC medicines with care. This will help you to clearly know the list of ingredients, how much to take, and any warnings. It may also help you not take too much acetaminophen. If you have questions or do not understand the information, ask your pharmacist or health care provider to explain it to you before you take the OTC medicine.    Managing nausea  Some people have an upset stomach after surgery. This is often because of anesthesia, pain, or pain medicine, or the stress of surgery. These tips will help you handle nausea and eat healthy foods as you get better. If you were on a special food plan before surgery, ask your doctor if you should follow it while you get better. These tips may help:    · Do not push yourself to eat. Your body will tell you when to eat and how much.  · Start off with clear liquids and soup. They are easier to digest.  · Next try semi-solid foods, such as mashed potatoes, applesauce, and gelatin, as you feel ready.  · Slowly move to solid foods. Dont eat fatty, rich, or spicy foods at first.  · Do not force yourself to have 3 large meals a day. Instead eat smaller amounts more often.  · Take pain medicines with a small amount of solid food, such as crackers or toast, to avoid nausea.     Call your surgeon if  · You still have pain an hour after taking medicine. The medicine may not be strong enough.  · You feel too sleepy, dizzy, or groggy.  The medicine may be too strong.  · You have side effects like nausea, vomiting, or skin changes, such as rash, itching, or hives.       If you have obstructive sleep apnea  You were given anesthesia medicine during surgery to keep you comfortable and free of pain. After surgery, you may have more apnea spells because of this medicine and other medicines you were given. The spells may last longer than usual.   At home:    · Keep using the continuous positive airway pressure (CPAP) device when you sleep. Unless your health care provider tells you not to, use it when you sleep, day or night. CPAP is a common device used to treat obstructive sleep apnea.  · Talk with your provider before taking any pain medicine, muscle relaxants, or sedatives. Your provider will tell you about the possible dangers of taking these medicines.    © 2765-7845 [employer]. [address] [place] [address], [place] [address]. All rights reserved. This information is not intended as a substitute for professional medical care. Always follow your healthcare professional's instructions.    PLEASE FOLLOW ANY OTHER INSTRUCTIONS PROVIDED TO YOU BY DR. MANN!

## 2017-07-28 NOTE — TRANSFER OF CARE
"Anesthesia Transfer of Care Note    Patient: Pradeep Arias    Procedure(s) Performed: Procedure(s) (LRB):  XI ROBOT ASSISTED LAPAROSCOPIC LYMPH NODE DISSECTION (N/A)  CONIZATION-CERVIX (CKC) (N/A)    Patient location: PACU    Anesthesia Type: general    Transport from OR: Transported from OR on 2-3 L/min O2 by NC with adequate spontaneous ventilation    Post pain: adequate analgesia    Post assessment: no apparent anesthetic complications    Post vital signs: stable    Level of consciousness: responds to stimulation    Nausea/Vomiting: no nausea/vomiting    Complications: none    Transfer of care protocol was followed      Last vitals:   Visit Vitals  /67 (BP Location: Right arm, Patient Position: Lying, BP Method: Automatic)   Pulse 68   Temp 36.8 °C (98.2 °F) (Oral)   Resp 16   Ht 5' 5" (1.651 m)   Wt 65.3 kg (144 lb)   LMP 07/02/2017   SpO2 100%   BMI 23.96 kg/m²     "

## 2017-07-28 NOTE — PLAN OF CARE
Patient prefers to have Billy (boyfriend) present for discharge teaching. Please contact them @[phone].

## 2017-07-28 NOTE — ANESTHESIA POSTPROCEDURE EVALUATION
"Anesthesia Post Evaluation    Patient: Pradeep Arias    Procedure(s) Performed: Procedure(s) (LRB):  XI ROBOT ASSISTED LAPAROSCOPIC LYMPH NODE DISSECTION (N/A)  CONIZATION-CERVIX (CKC) (N/A)    Final Anesthesia Type: general  Patient location during evaluation: PACU  Patient participation: Yes- Able to Participate  Level of consciousness: awake and alert  Post-procedure vital signs: reviewed and stable  Pain management: adequate  Airway patency: patent  PONV status at discharge: No PONV  Anesthetic complications: no      Cardiovascular status: hemodynamically stable  Respiratory status: unassisted and spontaneous ventilation  Hydration status: euvolemic  Follow-up not needed.        Visit Vitals  /66   Pulse 64   Temp 36.8 °C (98.2 °F) (Oral)   Resp 18   Ht 5' 5" (1.651 m)   Wt 65.3 kg (144 lb)   LMP 07/02/2017   SpO2 100%   BMI 23.96 kg/m²       Pain/Isrrael Score: Pain Assessment Performed: Yes (7/28/2017 10:37 AM)  Presence of Pain: denies (7/28/2017 10:55 AM)  Pain Rating Prior to Med Admin: 5 (7/28/2017 11:25 AM)  Isrrael Score: 8 (7/28/2017 10:55 AM)      "

## 2017-07-28 NOTE — INTERVAL H&P NOTE
The patient has been examined and the H&P has been reviewed:    I concur with the findings and no changes have occurred since H&P was written.    Anesthesia/Surgery risks, benefits and alternative options discussed and understood by patient/family.    Proceed to OR for CKC, Davinci assisted bilateral pelvic lymph node dissection.    Amparo Estrada, NP-C, [facility]  GYN Oncology          Active Hospital Problems    Diagnosis  POA    Cervical adenocarcinoma [C53.9]  Yes      Resolved Hospital Problems    Diagnosis Date Resolved POA   No resolved problems to display.

## 2017-07-28 NOTE — PLAN OF CARE
Pradeep Arias has met all discharge criteria from Phase II. Vital Signs are stable, ambulating  without difficulty. Discharge instructions given, patient verbalized understanding. Discharged from facility via wheelchair in stable condition.

## 2017-07-28 NOTE — H&P (VIEW-ONLY)
Subjective:      Patient ID: Pradeep Arias is a 22 y.o. female.    Chief Complaint: Follow-up (8 wk)      HPI  Patient is a 22yo  who originally presented as a referral from Dr. Hany Carpenter. Pap smear 3/22/17 showed cells suspicious for adenocarcinoma. Dr. Carpenter performed a cervical biopsy 3/31/17. Final pathology shows highly atypical cells after review at [facility].       No significant medical history. No abdominal surgeries. Desires future childbearing.      MRI pelvis 17 An actual mass is not seen. The inner and outer cervical stroma is preserved and no parametrial invasion adenopathy bone lesion or hydronephrosis is seen.     She is s/p CKC 17. 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      We have discussed treatment options including radical hysterectomy, radical trachelectomy, and ultraconservative treatment with CKC alone. She is a candidate for conservative treatment given small tumor size <2cm, negative LVSI. Her risk of parametrial involvement from review of historical data would be estimated to be low at <1% with these tumor features. This ultraconservative approach is currently being investigated on clinical trial [id number].  We also discussed the need for pelvic lymphadenectomy. She strongly desires future fertility and declines hysterectomy and is aware that radical hysterectomy is the standard of care.     As such I have recommended repeat excision with CKC to try to obtain negative margins and pelvic lymphadenectomy. She presents today for surgery planning.      iReview of Systems   Constitutional: Negative for appetite change, chills, fatigue and fever.   HENT: Negative for mouth sores.    Respiratory: Negative for cough and shortness of breath.    Cardiovascular: Negative for leg swelling.   Gastrointestinal: Negative for abdominal pain, blood in stool, constipation and diarrhea.   Endocrine: Negative for cold  intolerance.   Genitourinary: Negative for dysuria and vaginal bleeding.   Musculoskeletal: Negative for myalgias.   Skin: Negative for rash.   Allergic/Immunologic: Negative.    Neurological: Negative for weakness and numbness.   Hematological: Negative for adenopathy. Does not bruise/bleed easily.   Psychiatric/Behavioral: Negative for confusion.        Objective:   Physical Exam:   Constitutional: She is oriented to person, place, and time. She appears well-developed and well-nourished.    HENT:   Head: Normocephalic and atraumatic.    Eyes: EOM are normal. Pupils are equal, round, and reactive to light.    Neck: Normal range of motion. Neck supple. No thyromegaly present.    Cardiovascular: Normal rate, regular rhythm and intact distal pulses.     Pulmonary/Chest: Effort normal and breath sounds normal. No respiratory distress. She has no wheezes.        Abdominal: Soft. Bowel sounds are normal. She exhibits no distension, no ascites and no mass. There is no tenderness.             Musculoskeletal: Normal range of motion and moves all extremeties.      Lymphadenopathy:     She has no cervical adenopathy.        Right: No supraclavicular adenopathy present.        Left: No supraclavicular adenopathy present.    Neurological: She is alert and oriented to person, place, and time.    Skin: Skin is warm and dry. No rash noted.    Psychiatric: She has a normal mood and affect.       Assessment:     1. Cervical adenocarcinoma        Plan:   No orders of the defined types were placed in this encounter.    Plan for CKC and robotic pelvic lymphadenectomy 7/28/17. The risks, benefits, and indications of the procedure were discussed with the patient and her family members if present.  These included bleeding, transfusion, infection, damage to surrounding tissues (bowel, bladder, ureter), wound separation, conversion to laparotomy, lymphedema, perioperative cardiac events, VTE, pneumonia, and possible death.  She voiced  understanding, all questions were answered and consents were signed.

## 2017-07-29 NOTE — OR NURSING
Patient voided 50 cc clear urine . Scanned patients bladder 24 ccon bladder scan. Informed Dr. Cruz of results . Patient can be discharged.

## 2017-08-01 NOTE — OP NOTE
Procedure: Robotic pelvic lymph node dissection, CKC, ECC     Date of Surgery 07/28/2017     Surgeon: Merly Green MD     Assistant surgeon:Amparo Estrada-NP, certified first assit---no qualified resident was available     Pre-Op Diagnosis:   1. Stage IA2 adenocarcinoma of the cervix    Post-op Diagnosis:     1. Stage IA2 adenocarcinoma of the cervix     Complications: none     EBL: 200 cc      IVF: See anesthesia note     Urine Output: 200 cc via in/out cath prior to start of procedure     Specimen: Bilateral pelvic lymph nodes, Ectocervical conization, post CKC ECC     Findings: No evidence of intraperitoneal disease. Normal 6 week size uterus, normal fallopian tubes and ovaries bilaterally. Cervix was normal in appearance without gross lesions.     DESCRIPTION OF PROCEDURE:   The patient was taken to the Operating Room.  Informed   consent had been obtained.  She underwent general endotracheal anesthesia   without difficulty, was prepped and draped in the normal sterile fashion in the   dorsal lithotomy position.  Timeout was performed.  All parties agreed to the   planned procedure.  Perioperative antibiotics were administered.  Gamez catheter   was placed under sterile conditions.  A uterine manipulator was   secured in place in a standard fashion for uterine manipulation.  Gloves were   changed and attention was turned to the patient's abdomen.  The umbilicus was   inverted.  A Veress needle was gently inserted.  Intra-abdominal placement was   confirmed with low CO2 pressure and water drop test.  Abdomen was insufflated   and pneumoperitoneum was obtained.  Skin incision was made superior to the   umbilicus.  Robotic trocar was introduced and laparoscope was used to confirm   intra-abdominal placement.  Additional trocars were placed under direct   visualization, ensuring no injury to bowel or vasculature.  Two robotic trocars   to the left of the camera, 1 robotic trocar to the right of the camera and  an 8   mm assist port to the right of the camera.  The patient was placed in steep   Trendelenburg.  Survey of the abdomen and pelvis revealed the above findings.    Robot was docked and the operating surgeon reported to the console.  We opened   up the posterior leaf of the broad ligament bilaterally facilitating access to   the retroperitoneum.  Pararectal and paravesical spaces were developed and good   visualization of the ureter was established.  We then performed bilateral pelvic   lymphadenectomy.  We removed all fibrofatty tissue within defined boundaries.    Boundaries included laterally the psoas muscle and the general femoral nerve,   medially the superior vesicle artery and the ureter, cephalad the midpoint of   the bifurcation of the common iliac arteries, caudad the deep circumflex iliac   vein and the deep boundary bilaterally with the obturator nerve.  Lymph nodes   were then placed in EndoCatch bags separately and removed through the assist   port all contained within the EndoCatch bag. At the conclusion of the procedure, there was good hemostasis.  At this point, the abdominal portion of the procedure was deemed complete.  Pneumoperitoneum was released.  All trocars have been removed under   direct visualization.  Skin incisions were rendered hemostatic with Bovie   cautery.  These were sutured with 4-0 Monocryl suture in a subcuticular fashion   and then topped with sterile Dermabond.  Uterine manipulator was removed from   the vagina.       Attention was then turned to the patient's vagina where the weighted speculum was placed into the posterior fornix and a Right angle retractor was placed into the anterior fornix. Tenaculum placed on the anterior cervix. Two sutures of 0 chromic were used to ligate the cervical branches of the cervical artery at the 3 and 9 o'clock positions in a figure-of-eight suture. Cold-knife cone specimen was then obtained. This was handed off for pathologic review.  Next, endocervical curetting was performed of the canal. This was handed off also for pathologic review. The base of the cervical cone specimen was then cauterized using Bovie cautery. The margins of the cone specimen were also cauterized in a similar manner. The left chromic suture was used to create further hemostasis with a running locked suture from 9 to 3 oclock position along the anterior cervical margin. The right stitch was run in a similar manner form 3 to 9 oclock along the inferior incisional margin. Both sutures tied and cut. The cervical os noted to have remained patent.  The cone site was noted to be hemostatic. Monsel's was placed at the [facility] bed. The sutures were cut and all the instruments removed from the patient's vagina. All sponge, lap and needle counts were correct x2. The patient tolerated the procedure well, was awakened and transferred to the recovery room.

## 2017-08-04 ENCOUNTER — TELEPHONE (OUTPATIENT)
Dept: GYNECOLOGIC ONCOLOGY | Facility: CLINIC | Age: 22
End: 2017-08-04

## 2017-08-04 NOTE — TELEPHONE ENCOUNTER
Call to check on patient after surgery. She is recovering well at home. Also reviewed pathology which shows no residual AIS/malignancy and negative lymph nodes. She voiced understanding. Will schedule a post op appointment with me in a few weeks.

## 2017-08-04 NOTE — BRIEF OP NOTE
[facility]  Brief Operative Note     SUMMARY     Surgery Date: 7/28/2017     Surgeon(s) and Role:     * Merly Green MD - Primary    Assisting Surgeon: None    Pre-op Diagnosis:  Cervical adenocarcinoma [C53.9]    Post-op Diagnosis:  Post-Op Diagnosis Codes:     * Cervical adenocarcinoma [C53.9]    Procedure(s) (LRB):  XI ROBOT ASSISTED LAPAROSCOPIC LYMPH NODE DISSECTION (N/A)  CONIZATION-CERVIX (CKC) (N/A)    Anesthesia: General    Description of the findings of the procedure: Wide local excision performed without complication.     Estimated Blood Loss: * No values recorded between 7/28/2017  7:49 AM and 7/28/2017 10:36 AM *         Specimens:   Specimen (12h ago through future)    None          Discharge Note    SUMMARY     Admit Date: 7/28/2017    Discharge Date and Time: 7/28/2017  2:30 PM    Hospital Course (synopsis of major diagnoses, care, treatment, and services provided during the course of the hospital stay): Ms. Arias is a 22 year old female who presents for scheduled wide local excision. Procedure was without complications and the patient tolerated the procedure well. Please see op note for details. The patient was tolerating PO, voiding, ambulating without difficulty, and pain was well controlled after the procedure. Patient was discharged home on POD #0 with instructions to follow up in clinic in 6 weeks for postoperative check. Patient verbalized understanding.         Final Diagnosis: Post-Op Diagnosis Codes:     * Cervical adenocarcinoma [C53.9]    Disposition: Home or Self Care    Follow Up/Patient Instructions:     Medications:  Reconciled Home Medications:   Discharge Medication List as of 7/28/2017  1:36 PM      START taking these medications    Details   hydrocodone-acetaminophen 5-325mg (NORCO) 5-325 mg per tablet Take 1 tablet by mouth every 4 (four) hours as needed for Pain., Starting Fri 7/28/2017, Print      !! ibuprofen (ADVIL,MOTRIN) 800 MG tablet Take 1  tablet (800 mg total) by mouth 3 (three) times daily., Starting Fri 7/28/2017, Normal       !! - Potential duplicate medications found. Please discuss with provider.      CONTINUE these medications which have NOT CHANGED    Details   !! ibuprofen (ADVIL,MOTRIN) 200 MG tablet Take 200 mg by mouth every 6 (six) hours as needed for Pain., Historical Med      norelgestromin-ethinyl estradiol (ORTHO EVRA) 150-35 mcg/24 hr Place 1 patch onto the skin once a week. For 3 weeks each month and off one week., Starting 1/12/2017, Until Fri 1/12/18, Normal       !! - Potential duplicate medications found. Please discuss with provider.          Discharge Procedure Orders  Diet general     Activity as tolerated     Call MD for:  temperature >100.4     Call MD for:  persistent nausea and vomiting     Call MD for:  severe uncontrolled pain     Call MD for:  difficulty breathing, headache or visual disturbances     Call MD for:  redness, tenderness, or signs of infection (pain, swelling, redness, odor or green/yellow discharge around incision site)     Call MD for:  hives     Call MD for:  persistent dizziness or light-headedness     Call MD for:  extreme fatigue     No dressing needed       Follow-up Information     Merly Green MD In 2 weeks.    Specialty:  Gynecologic Oncology  Why:  post-op check  Contact information:  Jovany BLANCHARD  [facility] [id number] [phone]

## 2017-08-07 ENCOUNTER — TELEPHONE (OUTPATIENT)
Dept: GYNECOLOGIC ONCOLOGY | Facility: CLINIC | Age: 22
End: 2017-08-07

## 2017-08-07 NOTE — TELEPHONE ENCOUNTER
Returned patient call. Reports a lump under one of her robotic trocar incisions. Approximated, no erythema or drainage, no hematoma or bruising, probable suture knot. Observe.  Also yellow discharge with odor, not BV. No bleeding, itching or burning. No fever or chills. No dysuria. Advised probable drainage of Monsel's, not concerning.  Reviewed S&S of infection and to call/RTC for worsening symptoms.

## 2017-08-07 NOTE — TELEPHONE ENCOUNTER
----- Message from Jennifer Olivo sent at 8/7/2017  1:10 PM CDT -----  Pradeep Arias wants to speak with medical assistant/pt can be reached at [phone]           [facility]# [id number]

## 2017-08-09 ENCOUNTER — TELEPHONE (OUTPATIENT)
Dept: GYNECOLOGIC ONCOLOGY | Facility: CLINIC | Age: 22
End: 2017-08-09

## 2017-08-09 NOTE — TELEPHONE ENCOUNTER
----- Message from Jennifer Olivo sent at 8/9/2017  4:38 PM CDT -----  Pradeep Arias needs to speak with medical assistant/pt can be reached at [phone]       [facility]# [id number]

## 2017-08-30 ENCOUNTER — OFFICE VISIT (OUTPATIENT)
Dept: GYNECOLOGIC ONCOLOGY | Facility: CLINIC | Age: 22
End: 2017-08-30
Payer: MEDICAID

## 2017-08-30 VITALS
RESPIRATION RATE: 16 BRPM | DIASTOLIC BLOOD PRESSURE: 74 MMHG | SYSTOLIC BLOOD PRESSURE: 114 MMHG | BODY MASS INDEX: 24.32 KG/M2 | WEIGHT: 146.19 LBS | HEART RATE: 77 BPM

## 2017-08-30 DIAGNOSIS — C53.9 CERVICAL ADENOCARCINOMA: Primary | ICD-10-CM

## 2017-08-30 DIAGNOSIS — N76.0 ACUTE VAGINITIS: ICD-10-CM

## 2017-08-30 PROCEDURE — 99999 PR PBB SHADOW E&M-EST. PATIENT-LVL III: CPT | Mod: PBBFAC,,, | Performed by: OBSTETRICS & GYNECOLOGY

## 2017-08-30 PROCEDURE — 99213 OFFICE O/P EST LOW 20 MIN: CPT | Mod: PBBFAC | Performed by: OBSTETRICS & GYNECOLOGY

## 2017-08-30 PROCEDURE — 99024 POSTOP FOLLOW-UP VISIT: CPT | Mod: ,,, | Performed by: OBSTETRICS & GYNECOLOGY

## 2017-08-30 RX ORDER — METRONIDAZOLE 500 MG/1
500 TABLET ORAL EVERY 12 HOURS
Qty: 14 TABLET | Refills: 0 | Status: SHIPPED | OUTPATIENT
Start: 2017-08-30 | End: 2017-09-06

## 2017-08-30 NOTE — PROGRESS NOTES
Subjective:      Patient ID: Pradeep Arias is a 22 y.o. female.    Chief Complaint: Follow-up (4 wk f/u)      HPI  Patient is a 20yo  who originally presented as a referral from Dr. Hany Carpenter. Pap smear 3/22/17 showed cells suspicious for adenocarcinoma. Dr. Carpenter performed a cervical biopsy 3/31/17. Final pathology shows highly atypical cells after review at [place].       No significant medical history. No abdominal surgeries. Desires future childbearing.      MRI pelvis 17 An actual mass is not seen. The inner and outer cervical stroma is preserved and no parametrial invasion adenopathy bone lesion or hydronephrosis is seen.     She is s/p CKC 17. 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      We have discussed treatment options including radical hysterectomy, radical trachelectomy, and ultraconservative treatment with CKC alone. She is a candidate for conservative treatment given small tumor size <2cm, negative LVSI. Her risk of parametrial involvement from review of historical data would be estimated to be low at <1% with these tumor features. This ultraconservative approach is currently being investigated on clinical trial [id number].  We also discussed the need for pelvic lymphadenectomy. She strongly desires future fertility and declines hysterectomy and is aware that radical hysterectomy is the standard of care.      As such I recommended repeat excision with CKC to try to obtain negative dysplasia margins and pelvic lymphadenectomy.     She underwent repeat CKC/robotic bilateral PLND 17. Uncomplicated post op course. Final pathology shows negative lymph nodes and no residual dysplasia or malignancy.     She presents today for post op visit.       Review of Systems   Constitutional: Negative for appetite change, chills, fatigue and fever.   HENT: Negative for mouth sores.    Respiratory: Negative for cough and shortness of  breath.    Cardiovascular: Negative for leg swelling.   Gastrointestinal: Negative for abdominal pain, blood in stool, constipation and diarrhea.   Endocrine: Negative for cold intolerance.   Genitourinary: Negative for dysuria and vaginal bleeding.   Musculoskeletal: Negative for myalgias.   Skin: Negative for rash.   Allergic/Immunologic: Negative.    Neurological: Negative for weakness and numbness.   Hematological: Negative for adenopathy. Does not bruise/bleed easily.   Psychiatric/Behavioral: Negative for confusion.        Objective:   Physical Exam:   Constitutional: She is oriented to person, place, and time. She appears well-developed and well-nourished.    HENT:   Head: Normocephalic and atraumatic.    Eyes: EOM are normal. Pupils are equal, round, and reactive to light.    Neck: Normal range of motion. Neck supple. No thyromegaly present.    Cardiovascular: Normal rate, regular rhythm and intact distal pulses.     Pulmonary/Chest: Effort normal and breath sounds normal. No respiratory distress. She has no wheezes.        Abdominal: Soft. Bowel sounds are normal. She exhibits no distension, no ascites and no mass. There is no tenderness.     Genitourinary: Rectum normal and vagina normal. Pelvic exam was performed with patient supine. There is no lesion on the right labia. There is no lesion on the left labia. Cervix is normal.   Genitourinary Comments: Cervical cone bed healing well           Musculoskeletal: Normal range of motion and moves all extremeties.      Lymphadenopathy:     She has no cervical adenopathy.        Right: No inguinal and no supraclavicular adenopathy present.        Left: No inguinal and no supraclavicular adenopathy present.    Neurological: She is alert and oriented to person, place, and time.    Skin: Skin is warm and dry. No rash noted.    Psychiatric: She has a normal mood and affect.       Assessment:     1. Cervical adenocarcinoma    2. Acute vaginitis        Plan:   No  orders of the defined types were placed in this encounter.    Plan for follow up in 6 months with pap/ECC.

## 2017-11-18 ENCOUNTER — NURSE TRIAGE (OUTPATIENT)
Dept: ADMINISTRATIVE | Facility: CLINIC | Age: 22
End: 2017-11-18

## 2017-11-18 NOTE — TELEPHONE ENCOUNTER
"    Reason for Disposition   Patient sounds very sick or weak to the triager    Protocols used: ST SORE THROAT-A-    Patient is 8 weeks pregnant and is complaining of cold symptoms and a really bad sore throat. She states: "It feels like my throat is closing up." patient advised to go to the ED for evaluation, she verbalized understanding.   "

## 2017-11-19 ENCOUNTER — HOSPITAL ENCOUNTER (EMERGENCY)
Facility: OTHER | Age: 22
Discharge: HOME | End: 2017-11-19
Attending: EMERGENCY MEDICINE
Payer: MEDICAID

## 2017-11-19 VITALS
OXYGEN SATURATION: 100 % | DIASTOLIC BLOOD PRESSURE: 77 MMHG | HEIGHT: 64 IN | HEART RATE: 133 BPM | TEMPERATURE: 100 F | WEIGHT: 150 LBS | BODY MASS INDEX: 25.61 KG/M2 | RESPIRATION RATE: 16 BRPM | SYSTOLIC BLOOD PRESSURE: 129 MMHG

## 2017-11-19 DIAGNOSIS — J02.0 STREPTOCOCCAL PHARYNGITIS: Primary | ICD-10-CM

## 2017-11-19 LAB
B-HCG UR QL: POSITIVE
BILIRUBIN, POC UA: NEGATIVE
BLOOD, POC UA: NEGATIVE
CLARITY, POC UA: CLEAR
COLOR, POC UA: YELLOW
CTP QC/QA: YES
FLUAV AG NPH QL: NEGATIVE
FLUBV AG NPH QL: NEGATIVE
GLUCOSE, POC UA: NEGATIVE
KETONES, POC UA: ABNORMAL
LEUKOCYTE EST, POC UA: NEGATIVE
NITRITE, POC UA: NEGATIVE
PH UR STRIP: 6 [PH]
PROTEIN, POC UA: ABNORMAL
S PYO RRNA THROAT QL PROBE: POSITIVE
SPECIFIC GRAVITY, POC UA: 1.02
UROBILINOGEN, POC UA: 0.2 E.U./DL

## 2017-11-19 PROCEDURE — 99283 EMERGENCY DEPT VISIT LOW MDM: CPT | Mod: 25

## 2017-11-19 PROCEDURE — 87880 STREP A ASSAY W/OPTIC: CPT

## 2017-11-19 PROCEDURE — 96372 THER/PROPH/DIAG INJ SC/IM: CPT

## 2017-11-19 PROCEDURE — 81025 URINE PREGNANCY TEST: CPT | Performed by: EMERGENCY MEDICINE

## 2017-11-19 PROCEDURE — 63600175 PHARM REV CODE 636 W HCPCS: Performed by: EMERGENCY MEDICINE

## 2017-11-19 PROCEDURE — 81003 URINALYSIS AUTO W/O SCOPE: CPT

## 2017-11-19 PROCEDURE — 87804 INFLUENZA ASSAY W/OPTIC: CPT

## 2017-11-19 RX ADMIN — PENICILLIN G BENZATHINE 1.2 MILLION UNITS: 1200000 INJECTION, SUSPENSION INTRAMUSCULAR at 11:11

## 2017-11-20 NOTE — ED NOTES
Pt seated bedside, alert and oriented, reports throat pain and chills for the past few days, states others in the family have similar symptoms, pt is currently pregnant

## 2017-11-20 NOTE — ED PROVIDER NOTES
Encounter Date: 11/19/2017       History     Chief Complaint   Patient presents with    URI     pt c/o sore throat, HA, body aches, and chills x 3 day, pt states she is 9 weeks pregnant     22 y.o. Female UPT+ @ ~ 9WGA (stated by patient) this emergency department complaining of body aches, chills and sore throat that began 2 days ago.  She denies taking medication to alleviate the symptoms and reports a young child at home with similar symptoms.      The history is provided by the patient.     Review of patient's allergies indicates:   Allergen Reactions    Codeine Rash     Can take percocet     Past Medical History:   Diagnosis Date    Abnormal Pap smear of cervix 03/22/2017    Acute vaginitis 8/30/2017    Herpes simplex virus (HSV) infection      Past Surgical History:   Procedure Laterality Date    FRACTURE SURGERY       Family History   Problem Relation Age of Onset    Breast cancer Paternal Aunt     Stroke Paternal Grandfather     Cancer Paternal Grandmother     Stroke Maternal Grandfather     Colon cancer Neg Hx     Ovarian cancer Neg Hx      Social History   Substance Use Topics    Smoking status: Never Smoker    Smokeless tobacco: Never Used    Alcohol use Yes      Comment: socially     Review of Systems   Constitutional: Positive for chills. Negative for fever.   HENT: Positive for sore throat. Negative for congestion, postnasal drip, rhinorrhea and trouble swallowing.    Respiratory: Negative for cough and shortness of breath.    Cardiovascular: Negative for chest pain and palpitations.   Gastrointestinal: Negative for abdominal pain, diarrhea, nausea and vomiting.   Genitourinary: Negative for dysuria, frequency, hematuria, pelvic pain, vaginal bleeding, vaginal discharge and vaginal pain.   Musculoskeletal: Positive for myalgias.   Skin: Negative for rash.   Neurological: Negative for dizziness, light-headedness and headaches.   All other systems reviewed and are negative.      Physical  Exam     Initial Vitals [11/19/17 2047]   BP Pulse Resp Temp SpO2   138/82 (!) 111 18 99.9 °F (37.7 °C) 100 %      MAP       100.67         Physical Exam    Nursing note and vitals reviewed.  Constitutional: She appears well-developed and well-nourished. She is not diaphoretic. No distress.   HENT:   Head: Normocephalic and atraumatic.   Mouth/Throat: Uvula is midline and mucous membranes are normal. No uvula swelling. Posterior oropharyngeal edema and posterior oropharyngeal erythema present. No oropharyngeal exudate.   Eyes: Conjunctivae are normal. Pupils are equal, round, and reactive to light.   Neck: Normal range of motion. Neck supple.   Cardiovascular: Normal rate and intact distal pulses.   Pulmonary/Chest: No accessory muscle usage. No tachypnea. No respiratory distress.   Abdominal: She exhibits no distension. There is no tenderness.   Genitourinary:   Genitourinary Comments: Fetal heart tones 194   Musculoskeletal: Normal range of motion. She exhibits no tenderness.   Neurological: She is alert and oriented to person, place, and time.   Skin: Skin is warm. Capillary refill takes less than 2 seconds.   Psychiatric: She has a normal mood and affect.         ED Course   Procedures  Labs Reviewed   POCT URINE PREGNANCY - Abnormal; Notable for the following:        Result Value    POC Preg Test, Ur Positive (*)     All other components within normal limits   POCT URINALYSIS W/O SCOPE - Abnormal; Notable for the following:     Glucose, UA Negative (*)     Bilirubin, UA Negative (*)     Ketones, UA Trace (*)     Blood, UA Negative (*)     Protein, UA 1+ (*)     Nitrite, UA Negative (*)     Leukocytes, UA Negative (*)     All other components within normal limits   POCT URINALYSIS W/O SCOPE                               ED Course      Labs Reviewed  Admission on 11/19/2017   Component Date Value Ref Range Status    POC Preg Test, Ur 11/19/2017 Positive* Negative Final     Acceptable 11/19/2017  Yes   Final    Glucose, UA 11/19/2017 Negative*  Final    Bilirubin, UA 11/19/2017 Negative*  Final    Ketones, UA 11/19/2017 Trace*  Final    Spec Grav UA 11/19/2017 1.025   Final    Blood, UA 11/19/2017 Negative*  Final    PH, UA 11/19/2017 6.0   Final    Protein, UA 11/19/2017 1+*  Final    Urobilinogen, UA 11/19/2017 0.2  E.U./dL Final    Nitrite, UA 11/19/2017 Negative*  Final    Leukocytes, UA 11/19/2017 Negative*  Final    Color, UA 11/19/2017 Yellow   Final    Clarity, UA 11/19/2017 Clear   Final    Rapid Strep A Screen 11/19/2017 Positive* Negative Final     Acceptable 11/19/2017 Yes   Final    Rapid Influenza A Ag 11/19/2017 Negative  Negative Final    Rapid Influenza B Ag 11/19/2017 Negative  Negative Final     Acceptable 11/19/2017 Yes   Final        Medications given in ED    Medications   penicillin G benzathine (BICILLIN LA) injection 1.2 Million Units (1.2 Million Units Intramuscular Given 11/19/17 2329)     Discharge Medications     Medication List with Changes/Refills   Current Medications    CETIRIZINE (ZYRTEC) 10 MG TABLET    Take 1 tablet (10 mg total) by mouth every evening.    FLUTICASONE (FLONASE) 50 MCG/ACTUATION NASAL SPRAY    1 spray by Each Nare route once daily.    HYDROCODONE-ACETAMINOPHEN 5-325MG (NORCO) 5-325 MG PER TABLET    Take 1 tablet by mouth every 4 (four) hours as needed for Pain.    IBUPROFEN (ADVIL,MOTRIN) 200 MG TABLET    Take 200 mg by mouth every 6 (six) hours as needed for Pain.    IBUPROFEN (ADVIL,MOTRIN) 800 MG TABLET    Take 1 tablet (800 mg total) by mouth 3 (three) times daily.    NORELGESTROMIN-ETHINYL ESTRADIOL (ORTHO EVRA) 150-35 MCG/24 HR    Place 1 patch onto the skin once a week. For 3 weeks each month and off one week.             Patient discharged to home in stable condition with instructions to:   1. Please take all meds as prescribed.  2. Follow-up with your primary care doctor   3. Return precautions  discussed and patient and/or family/caretaker understands to return to the emergency room for any concerns including worsening of your current symptoms, fever, chills, night sweats, worsening pain, chest pain, shortness of breath, nausea, vomiting, diarrhea, bleeding, headache, difficulty talking, visual disturbances, weakness, numbness or any other acute concerns        Note was created using voice recognition software. Note may have occasional typographical errors that may not have been identified and edited despite good faye initial review prior to signing.    Clinical Impression:   The encounter diagnosis was Streptococcal pharyngitis.                           Chery Guajardo MD  12/20/17 040

## 2018-05-07 ENCOUNTER — TELEPHONE (OUTPATIENT)
Dept: GYNECOLOGIC ONCOLOGY | Facility: CLINIC | Age: 23
End: 2018-05-07

## 2018-05-07 NOTE — TELEPHONE ENCOUNTER
Spoke with pt. Pt states she needs to speak with Dr. Green in regards to diagnosis for insurance policy. Pt advised Dr. Green is in clinic, message will be forward to physician. She voiced understanding

## 2018-05-07 NOTE — TELEPHONE ENCOUNTER
----- Message from Rosa Sophy sent at 5/7/2018  3:34 PM CDT -----  Contact: NORBERT KEANREY [[id number]]            Name of Who is Calling: NORBERT KEARNEY [[id number]]    What is the request in detail: Patient called regarding her diagnosis, please call he to advise.      Can the clinic reply by [facility]: no      What [facility]: [phone]

## 2018-05-08 ENCOUNTER — TELEPHONE (OUTPATIENT)
Dept: GYNECOLOGIC ONCOLOGY | Facility: CLINIC | Age: 23
End: 2018-05-08

## 2018-05-14 ENCOUNTER — TELEPHONE (OUTPATIENT)
Dept: GYNECOLOGIC ONCOLOGY | Facility: CLINIC | Age: 23
End: 2018-05-14

## 2018-05-14 NOTE — TELEPHONE ENCOUNTER
Called patient to follow up from phones calls regarding diagnosis and also to encourage follow up as she has missed several scheduled surveillance visits.     No answer. Left detailed voicemail.

## 2018-05-22 ENCOUNTER — TELEPHONE (OUTPATIENT)
Dept: GYNECOLOGIC ONCOLOGY | Facility: CLINIC | Age: 23
End: 2018-05-22

## 2018-06-19 ENCOUNTER — OFFICE VISIT (OUTPATIENT)
Dept: GYNECOLOGIC ONCOLOGY | Facility: CLINIC | Age: 23
End: 2018-06-19
Payer: MEDICAID

## 2018-06-19 VITALS
HEART RATE: 50 BPM | HEIGHT: 64 IN | WEIGHT: 161.81 LBS | SYSTOLIC BLOOD PRESSURE: 124 MMHG | DIASTOLIC BLOOD PRESSURE: 73 MMHG | BODY MASS INDEX: 27.63 KG/M2

## 2018-06-19 DIAGNOSIS — C53.9 CERVICAL ADENOCARCINOMA: Primary | ICD-10-CM

## 2018-06-19 PROCEDURE — 99213 OFFICE O/P EST LOW 20 MIN: CPT | Mod: PBBFAC | Performed by: OBSTETRICS & GYNECOLOGY

## 2018-06-19 PROCEDURE — 88175 CYTOPATH C/V AUTO FLUID REDO: CPT

## 2018-06-19 PROCEDURE — 99999 PR PBB SHADOW E&M-EST. PATIENT-LVL III: CPT | Mod: PBBFAC,,, | Performed by: OBSTETRICS & GYNECOLOGY

## 2018-06-19 PROCEDURE — 99213 OFFICE O/P EST LOW 20 MIN: CPT | Mod: S$PBB,,, | Performed by: OBSTETRICS & GYNECOLOGY

## 2018-06-19 RX ORDER — FERROUS SULFATE 325(65) MG
1 TABLET ORAL 2 TIMES DAILY
Refills: 6 | COMMUNITY
Start: 2018-04-10 | End: 2020-05-19

## 2018-06-24 NOTE — PROGRESS NOTES
Subjective:      Patient ID: Pradeep Arias is a 23 y.o. female.    Chief Complaint: Follow-up (6 month f/u)      HPI  Patient is a 22yo  who originally presented as a referral from Dr. Hany Carpenter. Pap smear 3/22/17 showed cells suspicious for adenocarcinoma. Dr. Carpenter performed a cervical biopsy 3/31/17. Final pathology shows highly atypical cells after review at [facility].       No significant medical history. No abdominal surgeries. Desires future childbearing.      MRI pelvis 17 An actual mass is not seen. The inner and outer cervical stroma is preserved and no parametrial invasion adenopathy bone lesion or hydronephrosis is seen.     She is s/p CKC 17. 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      We have discussed treatment options including radical hysterectomy, radical trachelectomy, and ultraconservative treatment with CKC alone. She is a candidate for conservative treatment given small tumor size <2cm, negative LVSI. Her risk of parametrial involvement from review of historical data would be estimated to be low at <1% with these tumor features. This ultraconservative approach is currently being investigated on clinical trial [id number].  We also discussed the need for pelvic lymphadenectomy. She strongly desires future fertility and declines hysterectomy and is aware that radical hysterectomy is the standard of care.      As such I recommended repeat excision with CKC to try to obtain negative dysplasia margins and pelvic lymphadenectomy.      She underwent repeat CKC/robotic bilateral PLND 17. Uncomplicated post op course. Final pathology shows negative lymph nodes and no residual dysplasia or malignancy.      Subsequent pregnancy, now P2 delivered in 2018 (PTL 35 weeks). Reports having pap during pregnancy that was normal.     Presents today for follow up. Without complaints. Specifically denies N/V, pain, bleeding,  swelling, problems with bowel or bladder function.   Review of Systems   Constitutional: Negative for appetite change, chills, fatigue and fever.   HENT: Negative for mouth sores.    Respiratory: Negative for cough and shortness of breath.    Cardiovascular: Negative for leg swelling.   Gastrointestinal: Negative for abdominal pain, blood in stool, constipation and diarrhea.   Endocrine: Negative for cold intolerance.   Genitourinary: Negative for dysuria and vaginal bleeding.   Musculoskeletal: Negative for myalgias.   Skin: Negative for rash.   Allergic/Immunologic: Negative.    Neurological: Negative for weakness and numbness.   Hematological: Negative for adenopathy. Does not bruise/bleed easily.   Psychiatric/Behavioral: Negative for confusion.       Objective:   Physical Exam:   Constitutional: She is oriented to person, place, and time. She appears well-developed and well-nourished.    HENT:   Head: Normocephalic and atraumatic.    Eyes: EOM are normal. Pupils are equal, round, and reactive to light.    Neck: Normal range of motion. Neck supple. No thyromegaly present.    Cardiovascular: Normal rate, regular rhythm and intact distal pulses.     Pulmonary/Chest: Effort normal and breath sounds normal. No respiratory distress. She has no wheezes.        Abdominal: Soft. Bowel sounds are normal. She exhibits no distension, no ascites and no mass. There is no tenderness.     Genitourinary: Rectum normal, vagina normal and uterus normal. Pelvic exam was performed with patient supine. There is no lesion on the right labia. There is no lesion on the left labia. Cervix is normal. Right adnexum displays no mass. Left adnexum displays no mass. Additional cervical findings: pap smear done  Genitourinary Comments: Cervix somewhat flush with vaginal apex. No gross visible lesions or palpable abnormalities.            Musculoskeletal: Normal range of motion and moves all extremeties.      Lymphadenopathy:     She has no  cervical adenopathy.        Right: No inguinal and no supraclavicular adenopathy present.        Left: No inguinal and no supraclavicular adenopathy present.    Neurological: She is alert and oriented to person, place, and time.    Skin: Skin is warm and dry. No rash noted.    Psychiatric: She has a normal mood and affect.       Assessment:     1. Cervical adenocarcinoma        Plan:   No orders of the defined types were placed in this encounter.    Surveillance pap collected today. Subsequent pregnancy with successful delivery, P2 now. Readdressed standard of care with completion hysterectomy when childbearing completed. Plan for continued surveillance. RTC 3 months or sooner if needed.

## 2018-07-17 DIAGNOSIS — Z30.09 FAMILY PLANNING: ICD-10-CM

## 2018-07-17 RX ORDER — NORELGESTROMIN AND ETHINYL ESTRADIOL 150; 35 UG/D; UG/D
PATCH TRANSDERMAL
Qty: 3 PATCH | Refills: 6 | Status: SHIPPED | OUTPATIENT
Start: 2018-07-17 | End: 2019-04-15 | Stop reason: SDUPTHER

## 2018-09-14 ENCOUNTER — TELEPHONE (OUTPATIENT)
Dept: GYNECOLOGIC ONCOLOGY | Facility: CLINIC | Age: 23
End: 2018-09-14

## 2018-09-17 ENCOUNTER — TELEPHONE (OUTPATIENT)
Dept: GYNECOLOGIC ONCOLOGY | Facility: CLINIC | Age: 23
End: 2018-09-17

## 2019-01-25 ENCOUNTER — TELEPHONE (OUTPATIENT)
Dept: GYNECOLOGIC ONCOLOGY | Facility: CLINIC | Age: 24
End: 2019-01-25

## 2019-01-25 NOTE — TELEPHONE ENCOUNTER
----- Message from Myrtle Mendoza sent at 1/25/2019  3:11 PM CST -----  Contact: NORBERT KEARNEY [[id number]]  Name of Who is Calling:NORBERT KEARNEY [[id number]]        What is the request in detail: Pt requesting to schedule biopsy/pap. Contact at your earliest convenience.  Thanks-          Can the clinic reply by [facility]: Y    What Number to Call Back if not in [facility]: [phone]

## 2019-01-25 NOTE — TELEPHONE ENCOUNTER
Spoke with pt. Pt informed appt can be scheduled, if pap is needed it can be collected at the time of appt. Pt voiced understanding. Pt was transferred to [facility] to be scheduled.

## 2019-02-08 ENCOUNTER — TELEPHONE (OUTPATIENT)
Dept: GYNECOLOGIC ONCOLOGY | Facility: CLINIC | Age: 24
End: 2019-02-08

## 2019-02-12 ENCOUNTER — HOSPITAL ENCOUNTER (EMERGENCY)
Facility: HOSPITAL | Age: 24
Discharge: HOME | End: 2019-02-12
Attending: EMERGENCY MEDICINE
Payer: MEDICAID

## 2019-02-12 VITALS
WEIGHT: 148 LBS | HEIGHT: 64 IN | OXYGEN SATURATION: 100 % | HEART RATE: 76 BPM | SYSTOLIC BLOOD PRESSURE: 125 MMHG | DIASTOLIC BLOOD PRESSURE: 81 MMHG | BODY MASS INDEX: 25.27 KG/M2 | RESPIRATION RATE: 18 BRPM | TEMPERATURE: 99 F

## 2019-02-12 DIAGNOSIS — J06.9 ACUTE URI: Primary | ICD-10-CM

## 2019-02-12 DIAGNOSIS — R59.0 LAD (LYMPHADENOPATHY) OF LEFT CERVICAL REGION: ICD-10-CM

## 2019-02-12 LAB
B-HCG UR QL: NEGATIVE
CTP QC/QA: YES
FLUAV AG NPH QL: NEGATIVE
FLUBV AG NPH QL: NEGATIVE
S PYO RRNA THROAT QL PROBE: NEGATIVE

## 2019-02-12 PROCEDURE — 81025 URINE PREGNANCY TEST: CPT | Mod: ER | Performed by: EMERGENCY MEDICINE

## 2019-02-12 PROCEDURE — 87081 CULTURE SCREEN ONLY: CPT

## 2019-02-12 PROCEDURE — 99284 EMERGENCY DEPT VISIT MOD MDM: CPT | Mod: ER

## 2019-02-12 PROCEDURE — 87804 INFLUENZA ASSAY W/OPTIC: CPT | Mod: ER

## 2019-02-12 PROCEDURE — 87880 STREP A ASSAY W/OPTIC: CPT | Mod: ER

## 2019-02-12 RX ORDER — PREDNISONE 20 MG/1
40 TABLET ORAL DAILY
Qty: 10 TABLET | Refills: 0 | Status: SHIPPED | OUTPATIENT
Start: 2019-02-12 | End: 2019-02-17

## 2019-02-12 RX ORDER — IBUPROFEN 800 MG/1
800 TABLET ORAL EVERY 6 HOURS PRN
Qty: 20 TABLET | Refills: 0 | Status: SHIPPED | OUTPATIENT
Start: 2019-02-12 | End: 2020-05-19

## 2019-02-12 RX ORDER — FLUTICASONE PROPIONATE 50 MCG
2 SPRAY, SUSPENSION (ML) NASAL DAILY
Qty: 16 G | Refills: 0 | Status: SHIPPED | OUTPATIENT
Start: 2019-02-12 | End: 2019-04-23 | Stop reason: SDUPTHER

## 2019-02-12 RX ORDER — CLINDAMYCIN HYDROCHLORIDE 150 MG/1
300 CAPSULE ORAL 4 TIMES DAILY
Qty: 80 CAPSULE | Refills: 0 | Status: SHIPPED | OUTPATIENT
Start: 2019-02-12 | End: 2019-02-22

## 2019-02-12 RX ORDER — LORATADINE 10 MG/1
10 TABLET ORAL DAILY
Qty: 60 TABLET | Refills: 0 | COMMUNITY
Start: 2019-02-12 | End: 2019-04-23 | Stop reason: SDUPTHER

## 2019-02-12 RX ORDER — MONTELUKAST SODIUM 10 MG/1
10 TABLET ORAL NIGHTLY
Qty: 30 TABLET | Refills: 0 | Status: SHIPPED | OUTPATIENT
Start: 2019-02-12 | End: 2019-04-23 | Stop reason: SDUPTHER

## 2019-02-12 NOTE — ED PROVIDER NOTES
Encounter Date: 2/12/2019       History     Chief Complaint   Patient presents with    Sore Throat     sore throat with swollen lymphnodes, chills x 1 week. Pt is afebrile in triage     23 y.o. Female with no known medical problems presents to emergency department complaining of acute sore throat, runny nose, nasal congestion, sore throat and swollen glands since Tuesday.  She denies sick contacts.  She denies taking medication for symptoms.      The history is provided by the patient.     Review of patient's allergies indicates:   Allergen Reactions    Codeine Rash     Can take percocet     Past Medical History:   Diagnosis Date    Abnormal Pap smear of cervix 03/22/2017    Acute vaginitis 8/30/2017    Herpes simplex virus (HSV) infection      Past Surgical History:   Procedure Laterality Date    cervix biospy      CONIZATION[facility]CERVIX [facility]) N/A 7/28/2017    Per[facility] by [facility] at [facility] OR    CONIZATION-CERVIX ([facility]) N/A 5/11/2017    [facility] at [facility] OR 2ND FLR    FRACTURE SURGERY      lymphnode biospy      XI ROBOT ASSISTED LAPAROSCOPIC LYMPH NODE DISSECTION N/A 7/28/2017    Performed by [facility] at [facility] OR     Family History   Problem Relation Age of Onset    Breast cancer Paternal Aunt     Stroke Paternal Grandfather     Cancer Paternal Grandmother     Stroke Maternal Grandfather     Colon cancer Neg Hx     Ovarian cancer Neg Hx      Social History     Tobacco Use    Smoking status: Never Smoker    Smokeless tobacco: Never Used   Substance Use Topics    Alcohol use: Yes     Comment: socially    Drug use: No     Review of Systems   Constitutional: Negative for appetite change and fever.   HENT: Positive for congestion, rhinorrhea and sore throat. Negative for trouble swallowing and voice change.    Respiratory: Negative for cough and shortness of breath.    Cardiovascular: Negative for chest pain, palpitations and leg swelling.   Gastrointestinal:  Negative for nausea and vomiting.   Musculoskeletal: Positive for myalgias.   Neurological: Positive for headaches.   All other systems reviewed and are negative.      Physical Exam     Initial Vitals [02/12/19 0028]   BP Pulse Resp Temp SpO2   124/83 78 18 98.9 °F (37.2 °C) 100 %      MAP       --         Physical Exam    Nursing note and vitals reviewed.  Constitutional: She appears well-developed and well-nourished. She is not diaphoretic. No distress.   HENT:   Head: Normocephalic and atraumatic.   Eyes: Conjunctivae are normal. Pupils are equal, round, and reactive to light.   Neck: Normal range of motion. Neck supple.   Cardiovascular: Normal rate and intact distal pulses.   Pulmonary/Chest: No accessory muscle usage. No tachypnea. No respiratory distress.   Abdominal: She exhibits no distension. There is no tenderness.   Musculoskeletal: Normal range of motion. She exhibits no tenderness.   Lymphadenopathy:        Head (left side): No submental and no submandibular adenopathy present.     She has cervical adenopathy.        Left cervical: Superficial cervical adenopathy present.   Neurological: She is alert and oriented to person, place, and time. She has normal strength. Gait normal. GCS eye subscore is 4. GCS verbal subscore is 5. GCS motor subscore is 6.   Skin: Skin is warm. Capillary refill takes less than 2 seconds.   Psychiatric: She has a normal mood and affect.         ED Course   Procedures  Labs Reviewed   CULTURE, STREP A,  THROAT   POCT URINE PREGNANCY   POCT RAPID STREP A   POCT INFLUENZA A/B          Imaging Results    None                 Labs Reviewed  Admission on 02/12/2019, Discharged on 02/12/2019   Component Date Value Ref Range Status    POC Preg Test, Ur 02/12/2019 Negative  Negative Final     Acceptable 02/12/2019 Yes   Final    Rapid Strep A Screen 02/12/2019 Negative  Negative Final     Acceptable 02/12/2019 Yes   Final    Rapid Influenza A Ag  02/12/2019 Negative  Negative Final    Rapid Influenza B Ag 02/12/2019 Negative  Negative Final     Acceptable 02/12/2019 Yes   Final    Strep A Culture 02/12/2019 No  Group A  Streptococcus isolated   Final        Imaging Reviewed    Imaging Results    None         Medications given in ED    Medications - No data to display    Note was created using voice recognition software. Note may have occasional typographical errors that may not have been identified and edited despite good faye initial review prior to signing.  Discharge Medications     Discharge Medication List as of 2/12/2019  2:17 AM      START taking these medications    Details   clindamycin (CLEOCIN) 150 MG capsule Take 2 capsules (300 mg total) by mouth 4 (four) times daily. for 10 days, Starting Tue 2/12/2019, Until Fri 2/22/2019, Normal      fluticasone (FLONASE) 50 mcg/actuation nasal spray 2 sprays (100 mcg total) by Each Nare route once daily., Starting Tue 2/12/2019, Normal      ibuprofen (ADVIL,MOTRIN) 800 MG tablet Take 1 tablet (800 mg total) by mouth every 6 (six) hours as needed for Pain., Starting Tue 2/12/2019, Normal      loratadine (CLARITIN) 10 mg tablet Take 1 tablet (10 mg total) by mouth once daily., Starting Tue 2/12/2019, Until Wed 2/12/2020, OTC      montelukast (SINGULAIR) 10 mg tablet Take 1 tablet (10 mg total) by mouth every evening., Starting Tue 2/12/2019, Until Thu 3/14/2019, Normal      predniSONE (DELTASONE) 20 MG tablet Take 2 tablets (40 mg total) by mouth once daily. for 5 days, Starting Tue 2/12/2019, Until Sun 2/17/2019, Normal         CONTINUE these medications which have NOT CHANGED    Details   copper (PARAGARD T 380A) 380 square mm IUD by Intrauterine route., Historical Med      ferrous sulfate 325 mg (65 mg iron) Tab tablet Take 1 tablet by mouth 2 (two) times daily., Starting Tue 4/10/2018, Historical Med      hydrocodone-acetaminophen 5-325mg (NORCO) 5-325 mg per tablet Take 1 tablet by  mouth every 4 (four) hours as needed for Pain., Starting Fri 7/28/2017, Print      XULANE 150-35 mcg/24 hr PLACE 1 PATCH ONTO SKIN ONCE A WEEK FOR 3 WEEKS EACH MONTH AND OFF 1 WEEK, Normal                   Patient discharged to home in stable condition with instructions to:   1. Please take all meds as prescribed.  2. Follow-up with your primary care doctor   3. Return precautions discussed and patient and/or family/caretaker understands to return to the emergency room for any concerns including worsening of your current symptoms, fever, chills, night sweats, worsening pain, chest pain, shortness of breath, nausea, vomiting, diarrhea, bleeding, headache, difficulty talking, visual disturbances, weakness, numbness or any other acute concerns                   Clinical Impression:   The primary encounter diagnosis was Acute URI. A diagnosis of LAD (lymphadenopathy) of left cervical region was also pertinent to this visit.                             Chery Guajardo MD  02/18/19 [id number]

## 2019-02-13 ENCOUNTER — TELEPHONE (OUTPATIENT)
Dept: GYNECOLOGIC ONCOLOGY | Facility: CLINIC | Age: 24
End: 2019-02-13

## 2019-02-13 NOTE — TELEPHONE ENCOUNTER
----- Message from Myrtle Mendoza sent at 2/13/2019 11:41 AM CST -----  Contact: NORBERT KEARNEY [[id number]]  Name of Who is Calling:NORBERT KEARNEY [[id number]]        What is the request in detail: Pt requesting to r/s her 2.19.19 appointment. Contact at your earliest convenience.  Thanks-          Can the clinic reply by [facility]: N     What Number to Call Back if not [facility]: [phone]

## 2019-02-13 NOTE — TELEPHONE ENCOUNTER
----- Message from Christina Betancourt LPN sent at 2/13/2019 12:02 PM CST -----  Contact: NORBERT KEARNEY [[id number]]   Can you please call pt to r/s appt.  Thanks  ----- Message -----  From: Myrtle Mendoza  Sent: 2/13/2019  11:41 AM  To: [facility] Staff    Name of Who is Calling:NORBERT KEARNEY [[id number]]        What is the request in detail: Pt requesting to r/s her 2.19.19 appointment. Contact at your earliest convenience.  Thanks-          Can the clinic reply by [facility]: N     What Number to Call Back if not in [facility]: [phone]

## 2019-02-14 LAB — BACTERIA THROAT CULT: NORMAL

## 2019-03-04 ENCOUNTER — TELEPHONE (OUTPATIENT)
Dept: GYNECOLOGIC ONCOLOGY | Facility: CLINIC | Age: 24
End: 2019-03-04

## 2019-03-13 ENCOUNTER — TELEPHONE (OUTPATIENT)
Dept: GYNECOLOGIC ONCOLOGY | Facility: CLINIC | Age: 24
End: 2019-03-13

## 2019-03-13 NOTE — TELEPHONE ENCOUNTER
----- Message from Juan A Lundberg sent at 3/13/2019 11:27 AM CDT -----  Contact: NORBERT KEARNEY [[id number]]  Name of Who is Calling: NORBERT KEARNEY [[id number]]      What is the request in detail: Pt is requesting a call back from clinical team in regard to rescheduling her timothy the was on 3/06/19. I tried to schedule but was unable to.   Please contact to further discuss and advise.          Can the clinic reply by [facility]:       What Number to Call Back [facility] not [facility]: [phone]

## 2019-03-18 ENCOUNTER — OFFICE VISIT (OUTPATIENT)
Dept: GYNECOLOGIC ONCOLOGY | Facility: CLINIC | Age: 24
End: 2019-03-18
Payer: MEDICAID

## 2019-03-18 VITALS — BODY MASS INDEX: 24.96 KG/M2 | HEIGHT: 64 IN | WEIGHT: 146.19 LBS

## 2019-03-18 DIAGNOSIS — C53.9 CERVICAL ADENOCARCINOMA: Primary | ICD-10-CM

## 2019-03-18 PROCEDURE — 99214 OFFICE O/P EST MOD 30 MIN: CPT | Mod: S$PBB,,, | Performed by: OBSTETRICS & GYNECOLOGY

## 2019-03-18 PROCEDURE — 99212 OFFICE O/P EST SF 10 MIN: CPT | Mod: PBBFAC | Performed by: OBSTETRICS & GYNECOLOGY

## 2019-03-18 PROCEDURE — 99999 PR PBB SHADOW E&M-EST. PATIENT-LVL II: ICD-10-PCS | Mod: PBBFAC,,, | Performed by: OBSTETRICS & GYNECOLOGY

## 2019-03-18 PROCEDURE — 99214 PR OFFICE/OUTPT VISIT, EST, LEVL IV, 30-39 MIN: ICD-10-PCS | Mod: S$PBB,,, | Performed by: OBSTETRICS & GYNECOLOGY

## 2019-03-18 PROCEDURE — 99999 PR PBB SHADOW E&M-EST. PATIENT-LVL II: CPT | Mod: PBBFAC,,, | Performed by: OBSTETRICS & GYNECOLOGY

## 2019-03-18 PROCEDURE — 88175 CYTOPATH C/V AUTO FLUID REDO: CPT

## 2019-03-18 RX ORDER — PHENTERMINE HYDROCHLORIDE 37.5 MG/1
TABLET ORAL
Refills: 0 | COMMUNITY
Start: 2019-01-23 | End: 2019-04-23 | Stop reason: SDUPTHER

## 2019-03-18 RX ORDER — CETIRIZINE HYDROCHLORIDE 10 MG/1
10 TABLET ORAL DAILY
COMMUNITY
End: 2020-05-19

## 2019-03-18 NOTE — PROGRESS NOTES
Subjective:      Patient ID: Pradeep Arias is a 23 y.o. female.    Chief Complaint: Follow-up      HPI  Patient is a 22yo  who originally presented as a referral from Dr. Hany Carpenter. Pap smear 3/22/17 showed cells suspicious for adenocarcinoma. Dr. Carpenter performed a cervical biopsy 3/31/17. Final pathology shows highly atypical cells after review at [facility].       No significant medical history. No abdominal surgeries. Desires future childbearing.      MRI pelvis 17 An actual mass is not seen. The inner and outer cervical stroma is preserved and no parametrial invasion adenopathy bone lesion or hydronephrosis is seen.     She is s/p CKC 17. 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      We have discussed treatment options including radical hysterectomy, radical trachelectomy, and ultraconservative treatment with CKC alone. She is a candidate for conservative treatment given small tumor size <2cm, negative LVSI. Her risk of parametrial involvement from review of historical data would be estimated to be low at <1% with these tumor features. This ultraconservative approach is currently being investigated on clinical trial [id number].  We also discussed the need for pelvic lymphadenectomy. She strongly desires future fertility and declines hysterectomy and is aware that radical hysterectomy is the standard of care.      As such I recommended repeat excision with CKC to try to obtain negative dysplasia margins and pelvic lymphadenectomy.      She underwent repeat CKC/robotic bilateral PLND 17. Uncomplicated post op course. Final pathology shows negative lymph nodes and no residual dysplasia or malignancy.      Subsequent pregnancy, now P2 delivered in 2018 (PTL 35 weeks). Reports having pap during pregnancy that was normal.     Pap 2019 normal.     Presents today for follow up. Without complaints.   Review of Systems   Constitutional:  Negative for appetite change, chills, fatigue and fever.   HENT: Negative for mouth sores.    Respiratory: Negative for cough and shortness of breath.    Cardiovascular: Negative for leg swelling.   Gastrointestinal: Negative for abdominal pain, blood in stool, constipation and diarrhea.   Endocrine: Negative for cold intolerance.   Genitourinary: Negative for dysuria and vaginal bleeding.   Musculoskeletal: Negative for myalgias.   Skin: Negative for rash.   Allergic/Immunologic: Negative.    Neurological: Negative for weakness and numbness.   Hematological: Negative for adenopathy. Does not bruise/bleed easily.   Psychiatric/Behavioral: Negative for confusion.       Objective:   Physical Exam:   Constitutional: She is oriented to person, place, and time. She appears well-developed and well-nourished.    HENT:   Head: Normocephalic and atraumatic.    Eyes: EOM are normal. Pupils are equal, round, and reactive to light.    Neck: Normal range of motion. Neck supple. No thyromegaly present.    Cardiovascular: Normal rate, regular rhythm and intact distal pulses.     Pulmonary/Chest: Effort normal and breath sounds normal. No respiratory distress. She has no wheezes.        Abdominal: Soft. Bowel sounds are normal. She exhibits no distension, no ascites and no mass. There is no tenderness.     Genitourinary: Rectum normal, vagina normal and uterus normal. Pelvic exam was performed with patient supine. There is no lesion on the right labia. There is no lesion on the left labia. Cervix is normal. Right adnexum displays no mass. Left adnexum displays no mass. Additional cervical findings: pap smear done          Musculoskeletal: Normal range of motion and moves all extremeties.      Lymphadenopathy:     She has no cervical adenopathy.        Right: No inguinal and no supraclavicular adenopathy present.        Left: No inguinal and no supraclavicular adenopathy present.    Neurological: She is alert and oriented to person,  place, and time.    Skin: Skin is warm and dry. No rash noted.    Psychiatric: She has a normal mood and affect.       Assessment:     1. Cervical adenocarcinoma        Plan:   No orders of the defined types were placed in this encounter.    Surveillance pap collected today. P2 now. Readdressed completion hysterectomy when childbearing completed. Plan for continued surveillance. RTC 6 months or sooner if needed.

## 2019-03-25 ENCOUNTER — TELEPHONE (OUTPATIENT)
Dept: GYNECOLOGIC ONCOLOGY | Facility: CLINIC | Age: 24
End: 2019-03-25

## 2019-03-25 NOTE — TELEPHONE ENCOUNTER
----- Message from Merly Green MD sent at 3/23/2019  3:29 PM CDT -----  Please let patient know pap smear was normal.

## 2019-04-15 DIAGNOSIS — Z30.09 FAMILY PLANNING: ICD-10-CM

## 2019-04-15 RX ORDER — NORELGESTROMIN AND ETHINYL ESTRADIOL 150; 35 UG/D; UG/D
PATCH TRANSDERMAL
Qty: 3 PATCH | Refills: 0 | Status: SHIPPED | OUTPATIENT
Start: 2019-04-15 | End: 2019-06-06 | Stop reason: SDUPTHER

## 2019-06-06 DIAGNOSIS — Z30.09 FAMILY PLANNING: ICD-10-CM

## 2019-06-06 RX ORDER — NORELGESTROMIN AND ETHINYL ESTRADIOL 150; 35 UG/D; UG/D
PATCH TRANSDERMAL
Qty: 3 PATCH | Refills: 0 | Status: SHIPPED | OUTPATIENT
Start: 2019-06-06 | End: 2019-08-27 | Stop reason: SDUPTHER

## 2019-07-25 DIAGNOSIS — Z30.09 FAMILY PLANNING: ICD-10-CM

## 2019-07-25 RX ORDER — NORELGESTROMIN AND ETHINYL ESTRADIOL 150; 35 UG/D; UG/D
PATCH TRANSDERMAL
Qty: 3 PATCH | Refills: 0 | OUTPATIENT
Start: 2019-07-25

## 2019-09-13 ENCOUNTER — TELEPHONE (OUTPATIENT)
Dept: ADMINISTRATIVE | Facility: OTHER | Age: 24
End: 2019-09-13

## 2020-04-28 ENCOUNTER — TELEPHONE (OUTPATIENT)
Dept: GYNECOLOGIC ONCOLOGY | Facility: CLINIC | Age: 25
End: 2020-04-28

## 2020-04-28 NOTE — TELEPHONE ENCOUNTER
----- Message from Ramu Ervin MA sent at 4/27/2020  2:30 PM CDT -----  Contact: Milo   tel:   [phone]    Please call pt tomorrow to discuss follow up appts  ----- Message -----  From: Belén Hickman  Sent: 4/27/2020   2:09 PM CDT  To: [facility] Staff    Caller is asking if anyone can see her today.  Was supposed to see the dr. Every 6 mos.    Having a reoccurring  BV. Concerned about this.   Was supposed to have a  Cervical biopsy every 6 mos.    Pls call to discuss this.

## 2020-05-15 ENCOUNTER — TELEPHONE (OUTPATIENT)
Dept: GYNECOLOGIC ONCOLOGY | Facility: CLINIC | Age: 25
End: 2020-05-15

## 2020-05-15 NOTE — TELEPHONE ENCOUNTER
Spoke with pt. Complains of irregular bleeding in between periods. Pt also states her period is irregular. appt made to see Dr Green 5/19 at 2:00 at [facility]. Denies any other needs  ----- Message from Angie Huerta sent at 5/15/2020 10:13 AM CDT -----  Contact: NORBERT KEARNEY  Name of Who is Calling: NORBERT KEARNEY      What is the request in detail: Would like to speak to staff in regards to wanting to reschedule her appointment for a 6 month check up sooner than June, states she feels she needs to have a pap smear sooner because she has concerns and if she can't come in sooner she'll try to get in sooner with an OBGYN. Please advise.       Can the clinic reply by [facility]: Yes      What Number to Call Back if not in [facility]: [phone]

## 2020-05-18 ENCOUNTER — TELEPHONE (OUTPATIENT)
Dept: GYNECOLOGIC ONCOLOGY | Facility: CLINIC | Age: 25
End: 2020-05-18

## 2020-05-18 NOTE — TELEPHONE ENCOUNTER
Call to Remind our patient about her appointment tomorrow and our new rules in place she voiced understanding.

## 2020-05-19 ENCOUNTER — OFFICE VISIT (OUTPATIENT)
Dept: GYNECOLOGIC ONCOLOGY | Facility: CLINIC | Age: 25
End: 2020-05-19
Payer: MEDICAID

## 2020-05-19 VITALS
BODY MASS INDEX: 24.88 KG/M2 | HEART RATE: 80 BPM | WEIGHT: 145.75 LBS | DIASTOLIC BLOOD PRESSURE: 73 MMHG | HEIGHT: 64 IN | SYSTOLIC BLOOD PRESSURE: 118 MMHG

## 2020-05-19 DIAGNOSIS — N93.9 ABNORMAL UTERINE BLEEDING: ICD-10-CM

## 2020-05-19 DIAGNOSIS — C53.9 CERVICAL ADENOCARCINOMA: Primary | ICD-10-CM

## 2020-05-19 LAB — B-HCG UR QL: NEGATIVE

## 2020-05-19 PROCEDURE — 99214 PR OFFICE/OUTPT VISIT, EST, LEVL IV, 30-39 MIN: ICD-10-PCS | Mod: S$PBB,,, | Performed by: OBSTETRICS & GYNECOLOGY

## 2020-05-19 PROCEDURE — 99999 PR PBB SHADOW E&M-EST. PATIENT-LVL III: CPT | Mod: PBBFAC,,, | Performed by: OBSTETRICS & GYNECOLOGY

## 2020-05-19 PROCEDURE — 87624 HPV HI-RISK TYP POOLED RSLT: CPT

## 2020-05-19 PROCEDURE — 81025 URINE PREGNANCY TEST: CPT

## 2020-05-19 PROCEDURE — 99213 OFFICE O/P EST LOW 20 MIN: CPT | Mod: PBBFAC | Performed by: OBSTETRICS & GYNECOLOGY

## 2020-05-19 PROCEDURE — 99214 OFFICE O/P EST MOD 30 MIN: CPT | Mod: S$PBB,,, | Performed by: OBSTETRICS & GYNECOLOGY

## 2020-05-19 PROCEDURE — 88175 CYTOPATH C/V AUTO FLUID REDO: CPT

## 2020-05-19 PROCEDURE — 99999 PR PBB SHADOW E&M-EST. PATIENT-LVL III: ICD-10-PCS | Mod: PBBFAC,,, | Performed by: OBSTETRICS & GYNECOLOGY

## 2020-05-19 NOTE — PROGRESS NOTES
Subjective:      Patient ID: Pradeep Arias is a 25 y.o. female.    Chief Complaint: cervical adenocarcinoma      HPI  Patient is a 20yo  who originally presented as a referral from Dr. Hany Carpenter. Pap smear 3/22/17 showed cells suspicious for adenocarcinoma. Dr. Carpenter performed a cervical biopsy 3/31/17. Final pathology shows highly atypical cells after review at [facility].       No significant medical history. No abdominal surgeries. Desires future childbearing.      MRI pelvis 17 An actual mass is not seen. The inner and outer cervical stroma is preserved and no parametrial invasion adenopathy bone lesion or hydronephrosis is seen.     She is s/p CKC 17. 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      We have discussed treatment options including radical hysterectomy, radical trachelectomy, and ultraconservative treatment with CKC alone. She is a candidate for conservative treatment given small tumor size <2cm, negative LVSI. Her risk of parametrial involvement from review of historical data would be estimated to be low at <1% with these tumor features. This ultraconservative approach is currently being investigated on clinical trial YFT564.  We also discussed the need for pelvic lymphadenectomy. She strongly desires future fertility and declines hysterectomy and is aware that radical hysterectomy is the standard of care.      As such I recommended repeat excision with CKC to try to obtain negative dysplasia margins and pelvic lymphadenectomy.      She underwent repeat CKC/robotic bilateral PLND 17. Uncomplicated post op course. Final pathology shows negative lymph nodes and no residual dysplasia or malignancy.      Subsequent pregnancy, now P2 delivered in 2018 (PTL 35 weeks). Reports having pap during pregnancy that was normal.      Pap 2018 normal.  Pap 3/2019 normal.       Has not been seen since 3/2019, noncompliant with  follow up.   Presents today for follow up. Reports some irregular menses but has not been using her birth control patch as prescribed. She otherwise Specifically denies N/V, pain, swelling, unexpected weight change, SOB, problems with bowel or bladder function.   Disease free interval 3 years.    Review of Systems   Constitutional: Negative for appetite change, chills, fatigue and fever.   HENT: Negative for mouth sores.    Respiratory: Negative for cough and shortness of breath.    Cardiovascular: Negative for leg swelling.   Gastrointestinal: Negative for abdominal pain, blood in stool, constipation and diarrhea.   Endocrine: Negative for cold intolerance.   Genitourinary: Negative for dysuria and vaginal bleeding.   Musculoskeletal: Negative for myalgias.   Skin: Negative for rash.   Allergic/Immunologic: Negative.    Neurological: Negative for weakness and numbness.   Hematological: Negative for adenopathy. Does not bruise/bleed easily.   Psychiatric/Behavioral: Negative for confusion.       Objective:   Physical Exam:   Constitutional: She is oriented to person, place, and time. She appears well-developed and well-nourished.    HENT:   Head: Normocephalic and atraumatic.    Eyes: Pupils are equal, round, and reactive to light. EOM are normal.    Neck: Normal range of motion. Neck supple. No thyromegaly present.    Cardiovascular: Normal rate, regular rhythm and intact distal pulses.     Pulmonary/Chest: Effort normal and breath sounds normal. No respiratory distress. She has no wheezes.        Abdominal: Soft. Bowel sounds are normal. She exhibits no distension, no ascites and no mass. There is no tenderness.     Genitourinary: Rectum normal, vagina normal and uterus normal. Pelvic exam was performed with patient supine. There is no lesion on the right labia. There is no lesion on the left labia. Uterus is not fixed. Cervix is normal. Right adnexum displays no mass. Left adnexum displays no mass. Additional  cervical findings: pap smear done          Musculoskeletal: Normal range of motion and moves all extremeties.      Lymphadenopathy:     She has no cervical adenopathy.        Right: No inguinal and no supraclavicular adenopathy present.        Left: No inguinal and no supraclavicular adenopathy present.    Neurological: She is alert and oriented to person, place, and time.    Skin: Skin is warm and dry. No rash noted.    Psychiatric: She has a normal mood and affect.       Assessment:     1. Cervical adenocarcinoma    2. Abnormal uterine bleeding        Plan:     Orders Placed This Encounter   Procedures    HPV High Risk Genotypes, PCR    Pregnancy, urine rapid     No evidence of disease on today's exam.   Feels well, no new symptoms.   Surveillance pap and HPV collected today.  Consider completion hysterectomy at the completion of childbearing.   Disease free interval 3 years.    Recommend continued surveillance. RTC 6 months for sooner if needed.

## 2020-05-21 LAB
FINAL PATHOLOGIC DIAGNOSIS: NORMAL
Lab: NORMAL

## 2020-05-26 LAB
HPV HR 12 DNA SPEC QL NAA+PROBE: NEGATIVE
HPV16 AG SPEC QL: NEGATIVE
HPV18 DNA SPEC QL NAA+PROBE: NEGATIVE

## 2020-07-20 ENCOUNTER — HOSPITAL ENCOUNTER (EMERGENCY)
Facility: HOSPITAL | Age: 25
Discharge: HOME | End: 2020-07-20
Attending: EMERGENCY MEDICINE
Payer: MEDICAID

## 2020-07-20 VITALS
HEART RATE: 62 BPM | TEMPERATURE: 99 F | WEIGHT: 145 LBS | HEIGHT: 64 IN | DIASTOLIC BLOOD PRESSURE: 87 MMHG | OXYGEN SATURATION: 100 % | RESPIRATION RATE: 18 BRPM | BODY MASS INDEX: 24.75 KG/M2 | SYSTOLIC BLOOD PRESSURE: 129 MMHG

## 2020-07-20 DIAGNOSIS — N93.9 VAGINAL BLEEDING: Primary | ICD-10-CM

## 2020-07-20 LAB
B-HCG UR QL: NEGATIVE
BILIRUBIN, POC UA: NEGATIVE
BLOOD, POC UA: NEGATIVE
CLARITY, POC UA: NORMAL
COLOR, POC UA: YELLOW
CTP QC/QA: YES
GLUCOSE, POC UA: NEGATIVE
KETONES, POC UA: NEGATIVE
LEUKOCYTE EST, POC UA: NEGATIVE
NITRITE, POC UA: NEGATIVE
PH UR STRIP: 8 [PH]
PROTEIN, POC UA: NEGATIVE
SPECIFIC GRAVITY, POC UA: 1.02
UROBILINOGEN, POC UA: 0.2 E.U./DL

## 2020-07-20 PROCEDURE — 81025 URINE PREGNANCY TEST: CPT | Mod: ER | Performed by: NURSE PRACTITIONER

## 2020-07-20 PROCEDURE — 87661 TRICHOMONAS VAGINALIS AMPLIF: CPT

## 2020-07-20 PROCEDURE — 87491 CHLMYD TRACH DNA AMP PROBE: CPT | Mod: 59

## 2020-07-20 PROCEDURE — 87510 GARDNER VAG DNA DIR PROBE: CPT

## 2020-07-20 PROCEDURE — 81003 URINALYSIS AUTO W/O SCOPE: CPT | Mod: ER

## 2020-07-20 PROCEDURE — 99283 EMERGENCY DEPT VISIT LOW MDM: CPT | Mod: 25,ER

## 2020-07-20 PROCEDURE — 87481 CANDIDA DNA AMP PROBE: CPT | Mod: 59

## 2020-07-20 NOTE — ED PROVIDER NOTES
Encounter Date: 7/20/2020    SCRIBE #1 NOTE: I, Rae Dial, am scribing for, and in the presence of,  GEOFFREY Nichols. I have scribed the following portions of the note - Other sections scribed: HPI, ROS, PE.       History     Chief Complaint   Patient presents with    Vaginal Bleeding     pt reports vaginal bleeding only during sex x 3 weeks with intermittent vaginal pain.      This is a nontoxic appearing 25 y.o. female who presents to the ED for evaluation of vaginal bleeding during sexual intercourse for 3 weeks. Patient reports she has associated intermittent vaginal pain.  She reports intermittent vaginal discharge. She has a conization of cervix in 2017.  Patient states she recently had a Pap smear 6 weeks ago and it was normal.    The history is provided by the patient. No  was used.   Vaginal Bleeding  This is a new problem. The current episode started more than 1 week ago. The problem has not changed since onset.Pertinent negatives include no chest pain, no abdominal pain, no headaches and no shortness of breath. The symptoms are aggravated by intercourse. Nothing relieves the symptoms. She has tried nothing for the symptoms.     Review of patient's allergies indicates:   Allergen Reactions    Codeine Rash     Can take percocet     Past Medical History:   Diagnosis Date    Abnormal Pap smear of cervix 03/22/2017    Acute vaginitis 8/30/2017    Herpes simplex virus (HSV) infection      Past Surgical History:   Procedure Laterality Date    cervix biospy      FRACTURE SURGERY      lymphnode biospy       Family History   Problem Relation Age of Onset    Breast cancer Paternal Aunt     Stroke Paternal Grandfather     Cancer Paternal Grandmother     Stroke Maternal Grandfather     Colon cancer Neg Hx     Ovarian cancer Neg Hx      Social History     Tobacco Use    Smoking status: Never Smoker    Smokeless tobacco: Never Used   Substance Use Topics    Alcohol use: Yes      Comment: socially    Drug use: No     Review of Systems   Constitutional: Negative.  Negative for chills and fever.   HENT: Negative.    Eyes: Negative.    Respiratory: Negative.  Negative for shortness of breath.    Cardiovascular: Negative.  Negative for chest pain.   Gastrointestinal: Negative.  Negative for abdominal pain, nausea and vomiting.   Endocrine: Negative.    Genitourinary: Positive for vaginal bleeding and vaginal pain.   Musculoskeletal: Negative.    Skin: Negative.    Allergic/Immunologic: Negative.    Neurological: Negative.  Negative for headaches.   Hematological: Negative.    Psychiatric/Behavioral: Negative.    All other systems reviewed and are negative.      Physical Exam     Initial Vitals [07/20/20 1801]   BP Pulse Resp Temp SpO2   (!) 139/97 72 18 98.8 °F (37.1 °C) 99 %      MAP       --         Physical Exam    Nursing note and vitals reviewed.  Constitutional: She appears well-developed.   HENT:   Head: Normocephalic.   Nose: Nose normal.   Mouth/Throat: Oropharynx is clear and moist.   Eyes: Conjunctivae are normal.   Neck: Normal range of motion. Neck supple.   Cardiovascular: Normal rate, regular rhythm, S1 normal, S2 normal and normal heart sounds.   Pulmonary/Chest: Breath sounds normal. She has no wheezes.   Abdominal: Soft. Bowel sounds are normal.   Genitourinary:    Vagina and uterus normal.   There is no rash, tenderness or lesion on the right labia. There is no rash, tenderness or lesion on the left labia. Cervix exhibits no motion tenderness and no discharge. Right adnexum displays no mass and no tenderness. Left adnexum displays no mass and no tenderness.    Genitourinary Comments: Scant amount of blood on swabs.   Oliva Griffith RN witnessed vaginal exam.      Musculoskeletal: Normal range of motion.   Neurological: She is alert and oriented to person, place, and time.   Skin: Skin is warm and dry. Capillary refill takes less than 2 seconds.   Psychiatric: She has a  normal mood and affect. Her behavior is normal.         ED Course   Procedures  Labs Reviewed   VAGINOSIS SCREEN BY DNA PROBE   C. TRACHOMATIS/N. GONORRHOEAE BY AMP DNA   POCT URINE PREGNANCY   POCT URINALYSIS W/O SCOPE   POCT URINALYSIS W/O SCOPE          Imaging Results    None          Medical Decision Making:   History:   Old Medical Records: I decided to obtain old medical records.  Initial Assessment:   This is a nontoxic appearing 25 y.o. female who presents to the ED for evaluation of vaginal bleeding during sexual intercourse for 3 weeks. Patient reports she has associated intermittent vaginal pain.  Differential Diagnosis:   Urinary tract infection. STD exposure. Vaginal bleeding.  Clinical Tests:   Lab Tests: Ordered and Reviewed  The following lab test(s) were unremarkable: UPT  ED Management:  Physical exam.  Gonorrhea and  chlamydia pending.  Follow-up with gynecologist in 3 days.            Scribe Attestation:   Scribe #1: I performed the above scribed service and the documentation accurately describes the services I performed. I attest to the accuracy of the note.    This document was produced by a scribe under my direction and in my presence. I agree with the content of the note and have made any necessary edits.     GEOFFREY Nichols    07/20/2020 8:52 PM                      Clinical Impression:     1. Vaginal bleeding                ED Disposition Condition    Discharge Stable        ED Prescriptions     None        Follow-up Information     Follow up With Specialties Details Why Contact Info    Billy Yeboah MD Family Medicine In 2 days  [address] [facility] [address] [phone]

## 2020-07-21 NOTE — ED NOTES
Pt encouraged to refrain from sexual activity until symptoms improve and she has OBGYN follow up. V/U.

## 2020-07-23 LAB
CANDIDA RRNA VAG QL PROBE: POSITIVE
G VAGINALIS RRNA GENITAL QL PROBE: NEGATIVE
T VAGINALIS RRNA GENITAL QL PROBE: NEGATIVE

## 2020-07-24 ENCOUNTER — TELEPHONE (OUTPATIENT)
Dept: EMERGENCY MEDICINE | Facility: HOSPITAL | Age: 25
End: 2020-07-24

## 2020-07-24 DIAGNOSIS — B37.9 YEAST INFECTION: Primary | ICD-10-CM

## 2020-07-24 RX ORDER — FLUCONAZOLE 150 MG/1
150 TABLET ORAL ONCE
Qty: 1 TABLET | Refills: 0 | Status: SHIPPED | OUTPATIENT
Start: 2020-07-24 | End: 2020-07-24

## 2020-07-24 NOTE — TELEPHONE ENCOUNTER
fluconazole (DIFLUCAN) 150 MG Tab 150 mg, Once         Summary: Take 1 tablet (150 mg total) by mouth once. for 1 dose, Starting Fri 7/24/2020, Normal   Dose, Route, Frequency: 150 mg, Oral, Once  Start: 7/24/2020  End: 7/24/2020  Ord/Sold: 7/24/2020 (O)  Report  Adh:   Long-term:   Pharmacy: [id number] - [place], [address] AT SEC OF [facility]  Med Dose History       Patient Sig: Take 1 tablet (150 mg total) by mouth once. for 1 dose       Ordered on: 7/24/2020       Authorized by: CALOS AYALA       Dispense: 1 tablet       Refills: 0 ordered        Results of vaginal screen discussed with patient.  All questions answered.

## 2020-07-25 LAB
C TRACH DNA SPEC QL NAA+PROBE: NOT DETECTED
N GONORRHOEA DNA SPEC QL NAA+PROBE: NOT DETECTED

## 2020-07-28 ENCOUNTER — TELEPHONE (OUTPATIENT)
Dept: GYNECOLOGIC ONCOLOGY | Facility: CLINIC | Age: 25
End: 2020-07-28

## 2020-07-28 NOTE — TELEPHONE ENCOUNTER
Left voicemail asking that the patient give Dr. Anguiano office a call back to reschedule her missed appointment today. MA/LATISHA /Preceptor Layo RAM

## 2020-11-03 ENCOUNTER — OFFICE VISIT (OUTPATIENT)
Dept: URGENT CARE | Facility: CLINIC | Age: 25
End: 2020-11-03
Payer: MEDICAID

## 2020-11-03 VITALS
RESPIRATION RATE: 16 BRPM | TEMPERATURE: 97 F | DIASTOLIC BLOOD PRESSURE: 75 MMHG | SYSTOLIC BLOOD PRESSURE: 119 MMHG | OXYGEN SATURATION: 100 % | HEART RATE: 76 BPM

## 2020-11-03 DIAGNOSIS — J06.9 VIRAL URI WITH COUGH: Primary | ICD-10-CM

## 2020-11-03 DIAGNOSIS — J02.9 SORE THROAT: ICD-10-CM

## 2020-11-03 LAB
CTP QC/QA: YES
SARS-COV-2 RDRP RESP QL NAA+PROBE: NEGATIVE

## 2020-11-03 PROCEDURE — U0002 COVID-19 LAB TEST NON-CDC: HCPCS | Mod: QW,S$GLB,, | Performed by: PHYSICIAN ASSISTANT

## 2020-11-03 PROCEDURE — U0002: ICD-10-PCS | Mod: QW,S$GLB,, | Performed by: PHYSICIAN ASSISTANT

## 2020-11-03 PROCEDURE — 99214 PR OFFICE/OUTPT VISIT, EST, LEVL IV, 30-39 MIN: ICD-10-PCS | Mod: S$GLB,,, | Performed by: PHYSICIAN ASSISTANT

## 2020-11-03 PROCEDURE — 99214 OFFICE O/P EST MOD 30 MIN: CPT | Mod: S$GLB,,, | Performed by: PHYSICIAN ASSISTANT

## 2020-11-03 NOTE — PATIENT INSTRUCTIONS
"    If your condition worsens or fails to improve we recommend that you receive another evaluation at the ER immediately or contact your PCP to discuss your concerns or return here. You must understand that you've received an urgent care treatment only and that you may be released before all your medical problems are known or treated. You the patient will arrange for follouwp care as instructed.     If we discussed that I think your illness is viral, it will not respond to antibiotics and will last 5-7 days.    -  You can use cough medication over the counter as directed as needed for cough.    -  Flonase (fluticasone) is a nasal spray which may help with your symptoms.     -  If you just have drainage you can take plain Zyrtec, Claritin or Allegra   -  Zyrtec D, Claritin D or Allegra D can also help with symptoms of congestion and drainage.   -  If you have hypertension avoid using the "D" which is the decongestant.  Instead you can use Coricidin HBP for cold and cough symptoms.      -  If you just have a congested feeling you can take pseudoephedrine (unless you have high blood pressure) which you have to sign for behind the counter. Do not buy the phenylephrine which is on the shelf as it is not effective     Rest and fluids are also important.     Below are suggestions for symptomatic relief:              -Salt water gargles to soothe throat pain.              -Chloroseptic spray also helps to numb throat pain.              -Warm face compresses to help with facial sinus pain/pressure.              -Vicks vapor rub at night.              -Simple foods like chicken noodle soup.    -  Tylenol or ibuprofen can also be used as directed for pain unless you have an allergy to them or medical condition such as stomach ulcers, kidney or liver disease or blood thinners etc for which you should not be taking these type of medications.                   Viral Upper Respiratory Illness (Adult)  You have a viral upper " respiratory illness (URI), which is another term for the common cold. This illness is contagious during the first few days. It is spread through the air by coughing and sneezing. It may also be spread by direct contact (touching the sick person and then touching your own eyes, nose, or mouth). Frequent handwashing will decrease risk of spread. Most viral illnesses go away within 7 to 10 days with rest and simple home remedies. Sometimes the illness may last for several weeks. Antibiotics will not kill a virus, and they are generally not prescribed for this condition.    Home care  · If symptoms are severe, rest at home for the first 2 to 3 days. When you resume activity, don't let yourself get too tired.  · Avoid being exposed to cigarette smoke (yours or others).  · You may use acetaminophen or ibuprofen to control pain and fever, unless another medicine was prescribed. (Note: If you have chronic liver or kidney disease, have ever had a stomach ulcer or gastrointestinal bleeding, or are taking blood-thinning medicines, talk with your healthcare provider before using these medicines.) Aspirin should never be given to anyone under 18 years of age who is ill with a viral infection or fever. It may cause severe liver or brain damage.  · Your appetite may be poor, so a light diet is fine. Avoid dehydration by drinking 6 to 8 glasses of fluids per day (water, soft drinks, juices, tea, or soup). Extra fluids will help loosen secretions in the nose and lungs.  · Over-the-counter cold medicines will not shorten the length of time youre sick, but they may be helpful for the following symptoms: cough, sore throat, and nasal and sinus congestion. (Note: Do not use decongestants if you have high blood pressure.)  Follow-up care  Follow up with your healthcare provider, or as advised.  When to seek medical advice  Call your healthcare provider right away if any of these occur:  · Cough with lots of colored sputum  (mucus)  · Severe headache; face, neck, or ear pain  · Difficulty swallowing due to throat pain  · Fever of 100.4°F (38°C)  Call 911, or get immediate medical care  Call emergency services right away if any of these occur:  · Chest pain, shortness of breath, wheezing, or difficulty breathing  · Coughing up blood  · Inability to swallow due to throat pain  Date Last Reviewed: 9/13/2015  © 0281-0553 [employer]. [address] [place] [address], [place] [address]. All rights reserved. This information is not intended as a substitute for professional medical care. Always follow your healthcare professional's instructions.        Self-Care for Sore Throats    Sore throats happen for many reasons, such as colds, allergies, and infections caused by viruses or bacteria. In any case, your throat becomes red and sore. Your goal for self-care is to reduce your discomfort while giving your throat a chance to heal.  Moisten and soothe your throat  Tips include the following:  · Try a sip of water first thing after waking up.  · Keep your throat moist by drinking 6 or more glasses of clear liquids every day.  · Run a cool-air humidifier in your room overnight.  · Avoid cigarette smoke.   · Suck on throat lozenges, cough drops, hard candy, ice chips, or frozen fruit-juice bars. Use the sugar-free versions if your diet or medical condition requires them.  Gargle to ease irritation  Gargling every hour or 2 can ease irritation. Try gargling with 1 of these solutions:  · 1/4 teaspoon of salt in 1/2 cup of warm water  · An over-the-counter anesthetic gargle  Use medicine for more relief  Over-the-counter medicine can reduce sore throat symptoms. Ask your pharmacist if you have questions about which medicine to use:  · Ease pain with anesthetic sprays. Aspirin or an aspirin substitute also helps. Remember, never give aspirin to anyone 18 or younger, or if you are already taking blood thinners.   · For sore throats caused by  allergies, try antihistamines to block the allergic reaction.  · Remember: unless a sore throat is caused by a bacterial infection, antibiotics wont help you.  Prevent future sore throats  Prevention tips include the following:  · Stop smoking or reduce contact with secondhand smoke. Smoke irritates the tender throat lining.  · Limit contact with pets and with allergy-causing substances, such as pollen and mold.  · When youre around someone with a sore throat or cold, wash your hands often to keep viruses or bacteria from spreading.  · Dont strain your vocal cords.  Call your healthcare provider  Contact your healthcare provider if you have:  · A temperature over 101°F (38.3°C)  · White spots on the throat  · Great difficulty swallowing  · Trouble breathing  · A skin rash  · Recent exposure to someone else with strep bacteria  · Severe hoarseness and swollen glands in the neck or jaw   Date Last Reviewed: 8/1/2016  © 2014-8483 [employer]. [address] [place] [address], [place] [address]. All rights reserved. This information is not intended as a substitute for professional medical care. Always follow your healthcare professional's instructions.

## 2020-11-03 NOTE — PROGRESS NOTES
Subjective:       Patient ID: Pradeep Arias is a 25 y.o. female.    Vitals:  temperature is 97 °F (36.1 °C). Her blood pressure is 119/75 and her pulse is 76. Her respiration is 16 and oxygen saturation is 100%.     Chief Complaint: COVID-19 Concerns    Pt c/o fever, body aches, mild headache, cough, and sore throat for 3-4 days. States she has been taking tylenol with good control of fever. Denies known exposure to COVID-19. Denies CP, palpitations, SOB, difficulty breathing or swallowing, abdominal pain, N/V/D, worst headache of life, changes in vision, neck p(ain/stiffness, dizziness, confusion, rash.    Fever   This is a new problem. The current episode started in the past 7 days. The problem occurs intermittently. The problem has been unchanged. Associated symptoms include congestion, coughing, headaches, muscle aches and a sore throat. Pertinent negatives include no abdominal pain, chest pain, diarrhea, ear pain, nausea, rash, urinary pain, vomiting or wheezing. She has tried acetaminophen for the symptoms. The treatment provided moderate relief.       Constitution: Positive for fever. Negative for chills, sweating and fatigue.   HENT: Positive for congestion, postnasal drip and sore throat. Negative for ear pain, sinus pain, sinus pressure and trouble swallowing.    Neck: Negative for neck pain, neck stiffness and painful lymph nodes.   Cardiovascular: Negative for chest pain, leg swelling and palpitations.   Eyes: Negative for double vision and blurred vision.   Respiratory: Positive for cough. Negative for chest tightness, sputum production, shortness of breath, stridor and wheezing.    Gastrointestinal: Negative for abdominal pain, nausea, vomiting and diarrhea.   Genitourinary: Negative for dysuria, frequency, urgency and history of kidney stones.   Musculoskeletal: Positive for muscle ache. Negative for joint pain, joint swelling and muscle cramps.   Skin: Negative for color change, pale,  rash and bruising.   Allergic/Immunologic: Negative for seasonal allergies.   Neurological: Positive for headaches. Negative for dizziness, history of vertigo, light-headedness, passing out, coordination disturbances, numbness and tingling.   Hematologic/Lymphatic: Negative for swollen lymph nodes.   Psychiatric/Behavioral: Negative for nervous/anxious, sleep disturbance and depression. The patient is not nervous/anxious.        Objective:      Physical Exam   Constitutional: She is oriented to person, place, and time. She appears well-developed. She is cooperative.  Non-toxic appearance. She does not appear ill. No distress.      Comments:Patient is sitting pleasantly on exam table in no acute distress. Nontoxic appearing.    HENT:   Head: Normocephalic and atraumatic.   Ears:   Right Ear: Hearing, tympanic membrane, external ear and ear canal normal.   Left Ear: Hearing, tympanic membrane, external ear and ear canal normal.   Nose: Rhinorrhea and congestion present. No mucosal edema or nasal deformity. No epistaxis. Right sinus exhibits no maxillary sinus tenderness and no frontal sinus tenderness. Left sinus exhibits no maxillary sinus tenderness and no frontal sinus tenderness.   Mouth/Throat: Uvula is midline and mucous membranes are normal. No trismus in the jaw. Normal dentition. No uvula swelling. Posterior oropharyngeal erythema and cobblestoning present. No oropharyngeal exudate, posterior oropharyngeal edema or tonsillar abscesses. No tonsillar exudate.   Eyes: Pupils are equal, round, and reactive to light. Conjunctivae and lids are normal. No scleral icterus.   Neck: Trachea normal, normal range of motion, full passive range of motion without pain and phonation normal. Neck supple. No spinous process tenderness, no muscular tenderness and no pain with movement present. No neck rigidity. No edema, no erythema and normal range of motion present.   Cardiovascular: Normal rate, regular rhythm, normal heart  sounds and normal pulses.   Pulmonary/Chest: Effort normal and breath sounds normal. No stridor. No respiratory distress. She has no decreased breath sounds. She has no wheezes. She has no rhonchi. She has no rales.   Abdominal: Normal appearance.   Musculoskeletal: Normal range of motion.         General: No deformity.   Lymphadenopathy:     She has cervical adenopathy.   Neurological: She is alert and oriented to person, place, and time. She displays no weakness. She exhibits normal muscle tone. Coordination normal.   Skin: Skin is warm, dry, intact, not diaphoretic and not pale. Psychiatric: Her speech is normal and behavior is normal. Mood, judgment and thought content normal.   Nursing note and vitals reviewed.        Results for orders placed or performed in visit on 11/03/20   POCT COVID-19 Rapid Screening   Result Value Ref Range    POC Rapid COVID Negative Negative     Acceptable Yes      Clinically well appearing, VSS. Rapid COVID-19 negative. Discussed with patient symptoms appear viral in nature. Advised on symptomatic care. Patient reports she already has tylenol, flonse, zyrtec, and cough suppressants at home and does not need any additional medications at this time.   Advised on return/follow-up precautions. Advised on ER precautions. Answered all patient questions. Patient verbalized understanding and voiced agreement with current treatment plan.      Assessment:       1. Viral URI with cough    2. Sore throat        Plan:         Viral URI with cough    Sore throat  -     POCT COVID-19 Rapid Screening      Patient Instructions         If your condition worsens or fails to improve we recommend that you receive another evaluation at the ER immediately or contact your PCP to discuss your concerns or return here. You must understand that you've received an urgent care treatment only and that you may be released before all your medical problems are known or treated. You the patient will  "arrange for follouwp care as instructed.     If we discussed that I think your illness is viral, it will not respond to antibiotics and will last 5-7 days.    -  You can use cough medication over the counter as directed as needed for cough.    -  Flonase (fluticasone) is a nasal spray which may help with your symptoms.     -  If you just have drainage you can take plain Zyrtec, Claritin or Allegra   -  Zyrtec D, Claritin D or Allegra D can also help with symptoms of congestion and drainage.   -  If you have hypertension avoid using the "D" which is the decongestant.  Instead you can use Coricidin HBP for cold and cough symptoms.      -  If you just have a congested feeling you can take pseudoephedrine (unless you have high blood pressure) which you have to sign for behind the counter. Do not buy the phenylephrine which is on the shelf as it is not effective     Rest and fluids are also important.     Below are suggestions for symptomatic relief:              -Salt water gargles to soothe throat pain.              -Chloroseptic spray also helps to numb throat pain.              -Warm face compresses to help with facial sinus pain/pressure.              -Vicks vapor rub at night.              -Simple foods like chicken noodle soup.    -  Tylenol or ibuprofen can also be used as directed for pain unless you have an allergy to them or medical condition such as stomach ulcers, kidney or liver disease or blood thinners etc for which you should not be taking these type of medications.                   Viral Upper Respiratory Illness (Adult)  You have a viral upper respiratory illness (URI), which is another term for the common cold. This illness is contagious during the first few days. It is spread through the air by coughing and sneezing. It may also be spread by direct contact (touching the sick person and then touching your own eyes, nose, or mouth). Frequent handwashing will decrease risk of spread. Most viral " illnesses go away within 7 to 10 days with rest and simple home remedies. Sometimes the illness may last for several weeks. Antibiotics will not kill a virus, and they are generally not prescribed for this condition.    Home care  · If symptoms are severe, rest at home for the first 2 to 3 days. When you resume activity, don't let yourself get too tired.  · Avoid being exposed to cigarette smoke (yours or others).  · You may use acetaminophen or ibuprofen to control pain and fever, unless another medicine was prescribed. (Note: If you have chronic liver or kidney disease, have ever had a stomach ulcer or gastrointestinal bleeding, or are taking blood-thinning medicines, talk with your healthcare provider before using these medicines.) Aspirin should never be given to anyone under 18 years of age who is ill with a viral infection or fever. It may cause severe liver or brain damage.  · Your appetite may be poor, so a light diet is fine. Avoid dehydration by drinking 6 to 8 glasses of fluids per day (water, soft drinks, juices, tea, or soup). Extra fluids will help loosen secretions in the nose and lungs.  · Over-the-counter cold medicines will not shorten the length of time youre sick, but they may be helpful for the following symptoms: cough, sore throat, and nasal and sinus congestion. (Note: Do not use decongestants if you have high blood pressure.)  Follow-up care  Follow up with your healthcare provider, or as advised.  When to seek medical advice  Call your healthcare provider right away if any of these occur:  · Cough with lots of colored sputum (mucus)  · Severe headache; face, neck, or ear pain  · Difficulty swallowing due to throat pain  · Fever of 100.4°F (38°C)  Call 911, or get immediate medical care  Call emergency services right away if any of these occur:  · Chest pain, shortness of breath, wheezing, or difficulty breathing  · Coughing up blood  · Inability to swallow due to throat pain  Date Last  Reviewed: 9/13/2015  © 8124-6629 [employer]. [address] [place] [address], [place] [address]. All rights reserved. This information is not intended as a substitute for professional medical care. Always follow your healthcare professional's instructions.        Self-Care for Sore Throats    Sore throats happen for many reasons, such as colds, allergies, and infections caused by viruses or bacteria. In any case, your throat becomes red and sore. Your goal for self-care is to reduce your discomfort while giving your throat a chance to heal.  Moisten and soothe your throat  Tips include the following:  · Try a sip of water first thing after waking up.  · Keep your throat moist by drinking 6 or more glasses of clear liquids every day.  · Run a cool-air humidifier in your room overnight.  · Avoid cigarette smoke.   · Suck on throat lozenges, cough drops, hard candy, ice chips, or frozen fruit-juice bars. Use the sugar-free versions if your diet or medical condition requires them.  Gargle to ease irritation  Gargling every hour or 2 can ease irritation. Try gargling with 1 of these solutions:  · 1/4 teaspoon of salt in 1/2 cup of warm water  · An over-the-counter anesthetic gargle  Use medicine for more relief  Over-the-counter medicine can reduce sore throat symptoms. Ask your pharmacist if you have questions about which medicine to use:  · Ease pain with anesthetic sprays. Aspirin or an aspirin substitute also helps. Remember, never give aspirin to anyone 18 or younger, or if you are already taking blood thinners.   · For sore throats caused by allergies, try antihistamines to block the allergic reaction.  · Remember: unless a sore throat is caused by a bacterial infection, antibiotics wont help you.  Prevent future sore throats  Prevention tips include the following:  · Stop smoking or reduce contact with secondhand smoke. Smoke irritates the tender throat lining.  · Limit contact with pets and with  allergy-causing substances, such as pollen and mold.  · When youre around someone with a sore throat or cold, wash your hands often to keep viruses or bacteria from spreading.  · Dont strain your vocal cords.  Call your healthcare provider  Contact your healthcare provider if you have:  · A temperature over 101°F (38.3°C)  · White spots on the throat  · Great difficulty swallowing  · Trouble breathing  · A skin rash  · Recent exposure to someone else with strep bacteria  · Severe hoarseness and swollen glands in the neck or jaw   Date Last Reviewed: 8/1/2016  © 4317-4861 [employer]. [address] [place] [address]. All rights reserved. This information is not intended as a substitute for professional medical care. Always follow your healthcare professional's instructions.

## 2020-11-03 NOTE — LETTER
[address], [address] ? [place], [address] ? Phone [phone] ? Fax [phone]           Return to Work/School    Patient: Pradeep Arias  YOB: 1995   Date: 11/03/2020      To Whom It May Concern:     Pradeep Arias was in contact with/seen in my office on 11/03/2020. COVID-19 is present in our communities across the state. Not all patients are eligible or appropriate to be tested. In this situation, your employee meets the following criteria:     Pradeep Arias has met the criteria for COVID-19 testing and has a NEGATIVE result. The employee can return to work once they are asymptomatic for 24 hours without the use of fever reducing medications (Tylenol, Motrin, etc).     If you have any questions or concerns, or if I can be of further assistance, please do not hesitate to contact me.     Sincerely,    Kanu Paris PA-C

## 2021-02-23 NOTE — OPERATIVE NOTE ADDENDUM
Care Management Follow Up    Length of Stay (days): 30    Expected Discharge Date: 02/25/21     Per team, patient maybe ready for discharge to ARU as early as Thursday 2/25.  Updated [facility] admissions.  They will submit for insurance approval.    will continue to follow for support, counseling, education and resources.        DANA RoseSW         Certification of Assistant at Surgery       Surgery Date: 7/28/2017     Participating Surgeons:  Surgeon(s) and Role:     * Merly Green MD - Primary        Amparo Estrada NP-C, First Assist    Procedures:  Procedure(s) (LRB):  XI ROBOT ASSISTED LAPAROSCOPIC LYMPH NODE DISSECTION (N/A)  CONIZATION-CERVIX (CKC) (N/A)    Assistant Surgeon's Certification of Necessity:  I understand that section 1842 (b) (6) (d) of the Social Security Act generally prohibits Medicare Part B reasonable charge payment for the services of assistants at surgery in teaching hospitals when qualified residents are available to furnish such services. I certify that the services for which payment is claimed were medically necessary, and that no qualified resident was available to perform the services. I further understand that these services are subject to post-payment review by the Medicare carrier.      Amparo Estrada NP    07/28/2017  10:40 AM

## 2021-06-03 ENCOUNTER — PATIENT MESSAGE (OUTPATIENT)
Dept: GYNECOLOGIC ONCOLOGY | Facility: CLINIC | Age: 26
End: 2021-06-03

## 2021-06-08 ENCOUNTER — PATIENT MESSAGE (OUTPATIENT)
Dept: GYNECOLOGIC ONCOLOGY | Facility: CLINIC | Age: 26
End: 2021-06-08

## 2021-06-08 ENCOUNTER — OFFICE VISIT (OUTPATIENT)
Dept: GYNECOLOGIC ONCOLOGY | Facility: CLINIC | Age: 26
End: 2021-06-08
Payer: MEDICAID

## 2021-06-08 VITALS
WEIGHT: 163.56 LBS | BODY MASS INDEX: 28.08 KG/M2 | SYSTOLIC BLOOD PRESSURE: 125 MMHG | DIASTOLIC BLOOD PRESSURE: 76 MMHG | HEART RATE: 80 BPM

## 2021-06-08 DIAGNOSIS — Z30.41 ENCOUNTER FOR SURVEILLANCE OF CONTRACEPTIVE PILLS: ICD-10-CM

## 2021-06-08 DIAGNOSIS — C53.9 CERVICAL ADENOCARCINOMA: Primary | ICD-10-CM

## 2021-06-08 DIAGNOSIS — Z30.09 FAMILY PLANNING: ICD-10-CM

## 2021-06-08 PROCEDURE — 99999 PR PBB SHADOW E&M-EST. PATIENT-LVL II: CPT | Mod: PBBFAC,,, | Performed by: OBSTETRICS & GYNECOLOGY

## 2021-06-08 PROCEDURE — 99214 OFFICE O/P EST MOD 30 MIN: CPT | Mod: S$PBB,,, | Performed by: OBSTETRICS & GYNECOLOGY

## 2021-06-08 PROCEDURE — 99214 PR OFFICE/OUTPT VISIT, EST, LEVL IV, 30-39 MIN: ICD-10-PCS | Mod: S$PBB,,, | Performed by: OBSTETRICS & GYNECOLOGY

## 2021-06-08 PROCEDURE — 88175 CYTOPATH C/V AUTO FLUID REDO: CPT | Performed by: NURSE PRACTITIONER

## 2021-06-08 PROCEDURE — 99999 PR PBB SHADOW E&M-EST. PATIENT-LVL II: ICD-10-PCS | Mod: PBBFAC,,, | Performed by: OBSTETRICS & GYNECOLOGY

## 2021-06-08 PROCEDURE — 99212 OFFICE O/P EST SF 10 MIN: CPT | Mod: PBBFAC | Performed by: OBSTETRICS & GYNECOLOGY

## 2021-06-08 PROCEDURE — 87624 HPV HI-RISK TYP POOLED RSLT: CPT | Performed by: NURSE PRACTITIONER

## 2021-06-08 RX ORDER — NORELGESTROMIN AND ETHINYL ESTRADIOL 150; 35 UG/D; UG/D
1 PATCH TRANSDERMAL WEEKLY
Qty: 3 PATCH | Refills: 5 | Status: SHIPPED | OUTPATIENT
Start: 2021-06-08 | End: 2021-12-07

## 2021-06-12 LAB
FINAL PATHOLOGIC DIAGNOSIS: NORMAL
Lab: NORMAL

## 2021-06-15 ENCOUNTER — IMMUNIZATION (OUTPATIENT)
Dept: PRIMARY CARE CLINIC | Facility: CLINIC | Age: 26
End: 2021-06-15
Payer: MEDICAID

## 2021-06-15 DIAGNOSIS — Z23 NEED FOR VACCINATION: Primary | ICD-10-CM

## 2021-06-15 PROCEDURE — 91300 COVID-19, MRNA, LNP-S, PF, 30 MCG/0.3 ML DOSE VACCINE: ICD-10-PCS | Mod: S$GLB,,, | Performed by: INTERNAL MEDICINE

## 2021-06-15 PROCEDURE — 0001A COVID-19, MRNA, LNP-S, PF, 30 MCG/0.3 ML DOSE VACCINE: CPT | Mod: CV19,S$GLB,, | Performed by: INTERNAL MEDICINE

## 2021-06-15 PROCEDURE — 0001A COVID-19, MRNA, LNP-S, PF, 30 MCG/0.3 ML DOSE VACCINE: ICD-10-PCS | Mod: CV19,S$GLB,, | Performed by: INTERNAL MEDICINE

## 2021-06-15 PROCEDURE — 91300 COVID-19, MRNA, LNP-S, PF, 30 MCG/0.3 ML DOSE VACCINE: CPT | Mod: S$GLB,,, | Performed by: INTERNAL MEDICINE

## 2021-07-06 ENCOUNTER — IMMUNIZATION (OUTPATIENT)
Dept: PRIMARY CARE CLINIC | Facility: CLINIC | Age: 26
End: 2021-07-06
Payer: MEDICAID

## 2021-07-06 DIAGNOSIS — Z23 NEED FOR VACCINATION: Primary | ICD-10-CM

## 2021-07-06 PROCEDURE — 91300 COVID-19, MRNA, LNP-S, PF, 30 MCG/0.3 ML DOSE VACCINE: ICD-10-PCS | Mod: S$GLB,,, | Performed by: INTERNAL MEDICINE

## 2021-07-06 PROCEDURE — 91300 COVID-19, MRNA, LNP-S, PF, 30 MCG/0.3 ML DOSE VACCINE: CPT | Mod: S$GLB,,, | Performed by: INTERNAL MEDICINE

## 2021-07-06 PROCEDURE — 0002A COVID-19, MRNA, LNP-S, PF, 30 MCG/0.3 ML DOSE VACCINE: ICD-10-PCS | Mod: CV19,S$GLB,, | Performed by: INTERNAL MEDICINE

## 2021-07-06 PROCEDURE — 0002A COVID-19, MRNA, LNP-S, PF, 30 MCG/0.3 ML DOSE VACCINE: CPT | Mod: CV19,S$GLB,, | Performed by: INTERNAL MEDICINE

## 2021-08-23 ENCOUNTER — TELEPHONE (OUTPATIENT)
Dept: GYNECOLOGIC ONCOLOGY | Facility: CLINIC | Age: 26
End: 2021-08-23

## 2022-01-12 ENCOUNTER — PATIENT MESSAGE (OUTPATIENT)
Dept: GYNECOLOGIC ONCOLOGY | Facility: CLINIC | Age: 27
End: 2022-01-12
Payer: MEDICAID

## 2022-10-13 ENCOUNTER — TELEPHONE (OUTPATIENT)
Dept: GYNECOLOGIC ONCOLOGY | Facility: CLINIC | Age: 27
End: 2022-10-13
Payer: MEDICAID

## 2022-10-13 NOTE — TELEPHONE ENCOUNTER
"----- Message from Jennifer Craig MA sent at 10/13/2022 11:49 AM CDT -----    ----- Message -----  From: Cole Martin  Sent: 10/13/2022  10:26 AM CDT  To: [facility] Staff    Scheduling Request        Patient Status: Est      Scheduling Appt: Pap smear      Time/Date Preference: Soonest available      Contact Preference?: [phone] (Mobile)      Treating Provider: Peter       Do you feel you need to be seen today? No      Additional Notes:  "Thank you for all that you do for our patients"       "

## 2022-10-13 NOTE — TELEPHONE ENCOUNTER
Return call spoke with our patient about her  schedule appointment she voiced understanding of the date, time and location. All questions answered appointment mail. Provider Scheduling Coord.  Gynecologic Oncology MA/LATISHA /Preceptor Layo Simpson

## 2022-11-21 NOTE — PROGRESS NOTES
Subjective:      Patient ID: Pradeep Arias is a 27 y.o. female.    Chief Complaint: Follow-up      HPI  Patient is a 20yo  who originally presented as a referral from Dr. Hany Carpenter. Pap smear 3/22/17 showed cells suspicious for adenocarcinoma. Dr. Carpenter performed a cervical biopsy 3/31/17. Final pathology shows highly atypical cells after review at [facility].       No significant medical history. No abdominal surgeries. Desires future childbearing.      MRI pelvis 17 An actual mass is not seen. The inner and outer cervical stroma is preserved and no parametrial invasion adenopathy bone lesion or hydronephrosis is seen.     She is s/p CKC 17. 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      We have discussed treatment options including radical hysterectomy, radical trachelectomy, and ultraconservative treatment with CKC alone. She is a candidate for conservative treatment given small tumor size <2cm, negative LVSI. Her risk of parametrial involvement from review of historical data would be estimated to be low at <1% with these tumor features. This ultraconservative approach is currently being investigated on clinical trial [id number].  We also discussed the need for pelvic lymphadenectomy. She strongly desires future fertility and declines hysterectomy and is aware that radical hysterectomy is the standard of care.      As such I recommended repeat excision with CKC to try to obtain negative dysplasia margins and pelvic lymphadenectomy.      She underwent repeat CKC/robotic bilateral PLND 17. Uncomplicated post op course. Final pathology shows negative lymph nodes and no residual dysplasia or malignancy.      Subsequent pregnancy, now P2 delivered in 2018 (PTL 35 weeks). Reports having pap during pregnancy that was normal.      Pap 2018 normal.  Pap 3/2019 normal.  Pap 2020 negative and HPV negative.    Pap 2021 negative and HPV  negative       Today's visit:   Presents today for cervical cancer surveillance.   Specifically denies N/V, pain, swelling, unexpected weight change, SOB, problems with bowel or bladder function.  Disease free interval 5.5 years.  Combination patch for contraception.   Desires future fertility     Review of Systems   Constitutional:  Negative for appetite change, chills, fatigue and fever.   HENT:  Negative for mouth sores.    Respiratory:  Negative for cough and shortness of breath.    Cardiovascular:  Negative for leg swelling.   Gastrointestinal:  Negative for abdominal pain, blood in stool, constipation and diarrhea.   Endocrine: Negative for cold intolerance.   Genitourinary:  Negative for dysuria and vaginal bleeding.   Musculoskeletal:  Negative for myalgias.   Skin:  Negative for rash.   Allergic/Immunologic: Negative.    Neurological:  Negative for weakness and numbness.   Hematological:  Negative for adenopathy. Does not bruise/bleed easily.   Psychiatric/Behavioral:  Negative for confusion.      Objective:   Physical Exam:   Constitutional: She is oriented to person, place, and time. She appears well-developed and well-nourished.    HENT:   Head: Normocephalic and atraumatic.    Eyes: Pupils are equal, round, and reactive to light. EOM are normal.    Neck: No thyromegaly present.    Cardiovascular:  Normal rate, regular rhythm and intact distal pulses.             Pulmonary/Chest: Effort normal and breath sounds normal. No respiratory distress. She has no wheezes.        Abdominal: Soft. Bowel sounds are normal. She exhibits no distension and no mass. There is no abdominal tenderness.             Musculoskeletal: Normal range of motion and moves all extremeties.      Lymphadenopathy:     She has no cervical adenopathy.        Right: No supraclavicular adenopathy present.        Left: No supraclavicular adenopathy present.    Neurological: She is alert and oriented to person, place, and time.    Skin: Skin  is warm and dry. No rash noted.    Psychiatric: She has a normal mood and affect.     Assessment:     1. Cervical adenocarcinoma    2. Family planning        Plan:     Orders Placed This Encounter   Procedures    HPV High Risk Genotypes, PCR     No evidence of disease on today's exam.   Feels well, no new symptoms.   Surveillance pap and HPV collected today.  Consider completion hysterectomy at the completion of childbearing.   Disease free interval 5.5 years.  Contraception management.      Recommend continued surveillance. RTC 1 year or sooner if needed.      I spent approximately 30 minutes reviewing the available records and evaluating the patient, out of which over 50% of the time was spent face to face with the patient in counseling and coordinating this patient's care.

## 2022-11-22 ENCOUNTER — OFFICE VISIT (OUTPATIENT)
Dept: GYNECOLOGIC ONCOLOGY | Facility: CLINIC | Age: 27
End: 2022-11-22
Payer: MEDICAID

## 2022-11-22 VITALS
HEIGHT: 64 IN | WEIGHT: 187 LBS | SYSTOLIC BLOOD PRESSURE: 123 MMHG | BODY MASS INDEX: 31.92 KG/M2 | DIASTOLIC BLOOD PRESSURE: 76 MMHG | HEART RATE: 83 BPM

## 2022-11-22 DIAGNOSIS — Z30.09 FAMILY PLANNING: ICD-10-CM

## 2022-11-22 DIAGNOSIS — C53.9 CERVICAL ADENOCARCINOMA: Primary | ICD-10-CM

## 2022-11-22 PROCEDURE — 99999 PR PBB SHADOW E&M-EST. PATIENT-LVL III: ICD-10-PCS | Mod: PBBFAC,,, | Performed by: OBSTETRICS & GYNECOLOGY

## 2022-11-22 PROCEDURE — 99214 PR OFFICE/OUTPT VISIT, EST, LEVL IV, 30-39 MIN: ICD-10-PCS | Mod: S$PBB,,, | Performed by: OBSTETRICS & GYNECOLOGY

## 2022-11-22 PROCEDURE — 88175 CYTOPATH C/V AUTO FLUID REDO: CPT | Performed by: OBSTETRICS & GYNECOLOGY

## 2022-11-22 PROCEDURE — 3074F PR MOST RECENT SYSTOLIC BLOOD PRESSURE < 130 MM HG: ICD-10-PCS | Mod: CPTII,,, | Performed by: OBSTETRICS & GYNECOLOGY

## 2022-11-22 PROCEDURE — 87624 HPV HI-RISK TYP POOLED RSLT: CPT | Performed by: OBSTETRICS & GYNECOLOGY

## 2022-11-22 PROCEDURE — 1159F MED LIST DOCD IN RCRD: CPT | Mod: CPTII,,, | Performed by: OBSTETRICS & GYNECOLOGY

## 2022-11-22 PROCEDURE — 99213 OFFICE O/P EST LOW 20 MIN: CPT | Mod: PBBFAC | Performed by: OBSTETRICS & GYNECOLOGY

## 2022-11-22 PROCEDURE — 3008F BODY MASS INDEX DOCD: CPT | Mod: CPTII,,, | Performed by: OBSTETRICS & GYNECOLOGY

## 2022-11-22 PROCEDURE — 3078F PR MOST RECENT DIASTOLIC BLOOD PRESSURE < 80 MM HG: ICD-10-PCS | Mod: CPTII,,, | Performed by: OBSTETRICS & GYNECOLOGY

## 2022-11-22 PROCEDURE — 3078F DIAST BP <80 MM HG: CPT | Mod: CPTII,,, | Performed by: OBSTETRICS & GYNECOLOGY

## 2022-11-22 PROCEDURE — 3008F PR BODY MASS INDEX (BMI) DOCUMENTED: ICD-10-PCS | Mod: CPTII,,, | Performed by: OBSTETRICS & GYNECOLOGY

## 2022-11-22 PROCEDURE — 3074F SYST BP LT 130 MM HG: CPT | Mod: CPTII,,, | Performed by: OBSTETRICS & GYNECOLOGY

## 2022-11-22 PROCEDURE — 99214 OFFICE O/P EST MOD 30 MIN: CPT | Mod: S$PBB,,, | Performed by: OBSTETRICS & GYNECOLOGY

## 2022-11-22 PROCEDURE — 1159F PR MEDICATION LIST DOCUMENTED IN MEDICAL RECORD: ICD-10-PCS | Mod: CPTII,,, | Performed by: OBSTETRICS & GYNECOLOGY

## 2022-11-22 PROCEDURE — 99999 PR PBB SHADOW E&M-EST. PATIENT-LVL III: CPT | Mod: PBBFAC,,, | Performed by: OBSTETRICS & GYNECOLOGY

## 2022-11-22 RX ORDER — NORELGESTROMIN AND ETHINYL ESTRADIOL 150; 35 UG/D; UG/D
1 PATCH TRANSDERMAL WEEKLY
Qty: 12 PATCH | Refills: 3 | Status: SHIPPED | OUTPATIENT
Start: 2022-11-22 | End: 2023-10-17 | Stop reason: SDUPTHER

## 2022-11-22 RX ORDER — TRAMADOL HYDROCHLORIDE 50 MG/1
50 TABLET ORAL 3 TIMES DAILY PRN
COMMUNITY
Start: 2022-07-25

## 2022-11-29 ENCOUNTER — TELEPHONE (OUTPATIENT)
Dept: GYNECOLOGIC ONCOLOGY | Facility: CLINIC | Age: 27
End: 2022-11-29
Payer: MEDICAID

## 2022-11-29 LAB
CLINICAL INFO: NORMAL
CYTO CVX: NORMAL
CYTOLOGIST CVX/VAG CYTO: NORMAL
CYTOLOGIST CVX/VAG CYTO: NORMAL
CYTOLOGY CMNT CVX/VAG CYTO-IMP: NORMAL
CYTOLOGY PAP THIN PREP EXPLANATION: NORMAL
DATE OF PREVIOUS PAP: NORMAL
DATE PREVIOUS BX: YES
GEN CATEG CVX/VAG CYTO-IMP: NORMAL
HPV I/H RISK 4 DNA CVX QL NAA+PROBE: NOT DETECTED
LMP START DATE: NORMAL
MICROORGANISM CVX/VAG CYTO: NORMAL
PATHOLOGIST CVX/VAG CYTO: NORMAL
SERVICE CMNT-IMP: NORMAL
SPECIMEN SOURCE CVX/VAG CYTO: NORMAL
STAT OF ADQ CVX/VAG CYTO-IMP: NORMAL

## 2022-11-29 NOTE — TELEPHONE ENCOUNTER
Negative pap, negative HPV.   Recommend surveillance visit in 12 months.     No answer. Left voicemail.

## 2023-10-17 DIAGNOSIS — Z30.09 FAMILY PLANNING: ICD-10-CM

## 2023-10-17 RX ORDER — NORELGESTROMIN AND ETHINYL ESTRADIOL 150; 35 UG/D; UG/D
1 PATCH TRANSDERMAL WEEKLY
Qty: 12 PATCH | Refills: 3 | Status: SHIPPED | OUTPATIENT
Start: 2023-10-17 | End: 2023-11-27

## 2023-11-27 DIAGNOSIS — Z30.09 FAMILY PLANNING: ICD-10-CM

## 2023-11-27 RX ORDER — NORELGESTROMIN AND ETHINYL ESTRADIOL 150; 35 UG/D; UG/D
PATCH TRANSDERMAL
Qty: 12 PATCH | Refills: 3 | Status: SHIPPED | OUTPATIENT
Start: 2023-11-27

## 2023-11-27 NOTE — TELEPHONE ENCOUNTER
Refill Routing Note   Medication(s) are not appropriate for processing by [facility] for the following reason(s):        Non-participating provider    [facility] action(s):  Route               Appointments  past 12m or future 3m with PCP    Date Provider   Last Visit   11/22/2022 Merly rGeen MD   Next Visit   11/28/2023 Merly Green MD   ED visits in past 90 days: 0        Note composed:11:45 AM 11/27/2023

## 2024-08-07 NOTE — TELEPHONE ENCOUNTER
----- Message from Merly Green MD sent at 5/8/2018  9:35 AM CDT -----  Contact: NORBERT KEARNEY [[id number]]  Yes, she had cancer. I'm sorry she doesn't understand. I am happy to talk with her.     ----- Message -----  From: Tricia Spangler MA  Sent: 5/8/2018   9:08 AM  To: Merly Green MD    Good morning,     Pt states when she first came to the clinic, her diagnosis was pre-cancerous. She wants to know since it is malignant does it means cancerous?      ----- Message -----  From: Merly Green MD  Sent: 5/7/2018   4:50 PM  To: Tricia Spangler MA    Malignant.   ----- Message -----  From: Tricia Spangler MA  Sent: 5/7/2018   4:28 PM  To: Merly Green MD    Pt has a question regarding diagnosis for insurance policy. Is it benign or malignant?      ----- Message -----  From: Rosa Beckett  Sent: 5/7/2018   3:34 PM  To: [facility] Staff              Name of Who is Calling: NORBERT KEARNEY [[id number]]    What is the request in detail: Patient called regarding her diagnosis, please call he to advise.      Can the clinic reply by [facility]: no      What Number to Call Back if not in [facility]: [phone]                                             don b/l socks/supervision

## 2024-08-12 ENCOUNTER — TELEPHONE (OUTPATIENT)
Dept: GYNECOLOGIC ONCOLOGY | Facility: CLINIC | Age: 29
End: 2024-08-12
Payer: MEDICAID

## 2024-08-14 ENCOUNTER — OFFICE VISIT (OUTPATIENT)
Dept: GYNECOLOGIC ONCOLOGY | Facility: CLINIC | Age: 29
End: 2024-08-14
Payer: MEDICAID

## 2024-08-14 ENCOUNTER — DOCUMENTATION ONLY (OUTPATIENT)
Dept: GYNECOLOGIC ONCOLOGY | Facility: CLINIC | Age: 29
End: 2024-08-14
Payer: MEDICAID

## 2024-08-14 VITALS
BODY MASS INDEX: 29.11 KG/M2 | DIASTOLIC BLOOD PRESSURE: 79 MMHG | WEIGHT: 170.5 LBS | SYSTOLIC BLOOD PRESSURE: 131 MMHG | HEIGHT: 64 IN | HEART RATE: 72 BPM

## 2024-08-14 DIAGNOSIS — C53.9 CERVICAL ADENOCARCINOMA: Primary | ICD-10-CM

## 2024-08-14 PROCEDURE — 3008F BODY MASS INDEX DOCD: CPT | Mod: CPTII,,, | Performed by: OBSTETRICS & GYNECOLOGY

## 2024-08-14 PROCEDURE — 3075F SYST BP GE 130 - 139MM HG: CPT | Mod: CPTII,,, | Performed by: OBSTETRICS & GYNECOLOGY

## 2024-08-14 PROCEDURE — 99999 PR PBB SHADOW E&M-EST. PATIENT-LVL III: CPT | Mod: PBBFAC,,, | Performed by: OBSTETRICS & GYNECOLOGY

## 2024-08-14 PROCEDURE — 99214 OFFICE O/P EST MOD 30 MIN: CPT | Mod: S$PBB,,, | Performed by: OBSTETRICS & GYNECOLOGY

## 2024-08-14 PROCEDURE — 99213 OFFICE O/P EST LOW 20 MIN: CPT | Mod: PBBFAC | Performed by: OBSTETRICS & GYNECOLOGY

## 2024-08-14 PROCEDURE — 3078F DIAST BP <80 MM HG: CPT | Mod: CPTII,,, | Performed by: OBSTETRICS & GYNECOLOGY

## 2024-08-14 NOTE — PROGRESS NOTES
Subjective:      Patient ID: Pradeep Arias is a 29 y.o. female.    Chief Complaint: No chief complaint on file.        HPI  Patient is a 22yo  who originally presented as a referral from Dr. Hany Carpenter. Pap smear 3/22/17 showed cells suspicious for adenocarcinoma. Dr. Carpenter performed a cervical biopsy 3/31/17. Final pathology shows highly atypical cells after review at [facility].       No significant medical history. No abdominal surgeries. Desires future childbearing.      MRI pelvis 17 An actual mass is not seen. The inner and outer cervical stroma is preserved and no parametrial invasion adenopathy bone lesion or hydronephrosis is seen.     She is s/p CKC 17. 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      We have discussed treatment options including radical hysterectomy, radical trachelectomy, and ultraconservative treatment with CKC alone. She is a candidate for conservative treatment given small tumor size <2cm, negative LVSI. Her risk of parametrial involvement from review of historical data would be estimated to be low at <1% with these tumor features. This ultraconservative approach is currently being investigated on clinical trial [id number].  We also discussed the need for pelvic lymphadenectomy. She strongly desires future fertility and declines hysterectomy and is aware that radical hysterectomy is the standard of care.      As such I recommended repeat excision with CKC to try to obtain negative dysplasia margins and pelvic lymphadenectomy.      She underwent repeat CKC/robotic bilateral PLND 17. Uncomplicated post op course. Final pathology shows negative lymph nodes and no residual dysplasia or malignancy.      Subsequent pregnancy, now P2 delivered in 2018 (PTL 35 weeks). Reports having pap during pregnancy that was normal.      Pap 2018 normal.  Pap 3/2019 normal.  Pap 2020 negative and HPV negative.    Pap  6/2021 negative and HPV negative       Today's visit:   Presents today for cervical cancer surveillance.   Last seen in 11/2922: pap and HPV negative    Specifically denies N/V, pain, swelling, unexpected weight change, SOB, problems with bowel or bladder function. No abnormal bleeding.     Disease free interval 7 years.     Continues to desire future fertility     Review of Systems   Constitutional:  Negative for appetite change, chills, fatigue and fever.   HENT:  Negative for mouth sores.    Respiratory:  Negative for cough and shortness of breath.    Cardiovascular:  Negative for leg swelling.   Gastrointestinal:  Negative for abdominal pain, blood in stool, constipation and diarrhea.   Endocrine: Negative for cold intolerance.   Genitourinary:  Negative for dysuria and vaginal bleeding.   Musculoskeletal:  Negative for myalgias.   Skin:  Negative for rash.   Allergic/Immunologic: Negative.    Neurological:  Negative for weakness and numbness.   Hematological:  Negative for adenopathy. Does not bruise/bleed easily.   Psychiatric/Behavioral:  Negative for confusion.        Objective:   Physical Exam:   Constitutional: She is oriented to person, place, and time. She appears well-developed and well-nourished.    HENT:   Head: Normocephalic and atraumatic.    Eyes: Pupils are equal, round, and reactive to light. EOM are normal.    Neck: No thyromegaly present.    Cardiovascular:  Normal rate, regular rhythm and intact distal pulses.             Pulmonary/Chest: Effort normal and breath sounds normal. No respiratory distress. She has no wheezes.        Abdominal: Soft. Bowel sounds are normal. She exhibits no distension and no mass. There is no abdominal tenderness.     Genitourinary:    Vagina and uterus normal.      Pelvic exam was performed with patient supine.   There is no lesion on the right labia. There is no lesion on the left labia. Cervix is normal. Right adnexum displays no mass. Left adnexum displays no  mass.    pap smear completedUterus is not fixed.           Musculoskeletal: Normal range of motion and moves all extremeties.      Lymphadenopathy:     She has no cervical adenopathy. No inguinal adenopathy noted on the right or left side.        Right: No supraclavicular adenopathy present.        Left: No supraclavicular adenopathy present.    Neurological: She is alert and oriented to person, place, and time.    Skin: Skin is warm and dry. No rash noted.    Psychiatric: She has a normal mood and affect.       Assessment:     1. Cervical adenocarcinoma          Plan:     Orders Placed This Encounter   Procedures    HPV High Risk Genotypes, PCR     No evidence of disease on today's exam.   Feels well, no new symptoms.   Surveillance pap and HPV collected today.  Consider completion hysterectomy at the completion of childbearing.   Disease free interval 7 years.     Recommend continued surveillance. RTC 1 year or sooner if needed.      I spent approximately 30 minutes reviewing the available records and evaluating the patient, out of which over 50% of the time was spent face to face with the patient in counseling and coordinating this patient's care.

## 2024-12-06 DIAGNOSIS — Z30.09 FAMILY PLANNING: ICD-10-CM

## 2024-12-06 RX ORDER — NORELGESTROMIN AND ETHINYL ESTRADIOL 35; 150 UG/MG; UG/MG
1 PATCH TRANSDERMAL WEEKLY
Qty: 12 PATCH | Refills: 3 | Status: SHIPPED | OUTPATIENT
Start: 2024-12-06

## 2024-12-06 NOTE — TELEPHONE ENCOUNTER
----- Message from Summer sent at 12/6/2024 11:10 AM CST -----  Regarding: XULANE 150-35 mcg/24 hr  Type:  RX Refill Request     Who Called: pt    Refill or New Rx:refill    RX Name and Strength:XULANE 150-35 mcg/24 hr    How is the patient currently taking it? (ex. 1XDay):    Is this a 30 day or 90 day RX:    Preferred Pharmacy with phone number:[id number] [place], [place] - [facility] [address] AT [facility] [address]   Phone: [phone]  Fax: [phone]    Local or Mail Order:local    Ordering Provider:    Would the patient rather a call back or a response via [facility]? Call back    Best Call Back Number:[phone]     Additional Information:

## 2024-12-09 ENCOUNTER — PATIENT MESSAGE (OUTPATIENT)
Dept: GYNECOLOGIC ONCOLOGY | Facility: CLINIC | Age: 29
End: 2024-12-09
Payer: MEDICAID

## 2024-12-13 ENCOUNTER — OFFICE VISIT (OUTPATIENT)
Dept: GASTROENTEROLOGY | Facility: CLINIC | Age: 29
End: 2024-12-13
Payer: MEDICAID

## 2024-12-13 VITALS
HEIGHT: 64 IN | WEIGHT: 182.13 LBS | HEART RATE: 96 BPM | SYSTOLIC BLOOD PRESSURE: 117 MMHG | BODY MASS INDEX: 31.09 KG/M2 | DIASTOLIC BLOOD PRESSURE: 84 MMHG | OXYGEN SATURATION: 98 %

## 2024-12-13 DIAGNOSIS — K59.04 CHRONIC IDIOPATHIC CONSTIPATION: Primary | ICD-10-CM

## 2024-12-13 PROCEDURE — 99999 PR PBB SHADOW E&M-EST. PATIENT-LVL III: CPT | Mod: PBBFAC,,, | Performed by: NURSE PRACTITIONER

## 2024-12-13 PROCEDURE — 99213 OFFICE O/P EST LOW 20 MIN: CPT | Mod: PBBFAC,PN | Performed by: NURSE PRACTITIONER

## 2024-12-13 NOTE — PROGRESS NOTES
GASTROENTEROLOGY CLINIC NOTE    Chief Complaint: The encounter diagnosis was Chronic idiopathic constipation.  Referring provider/PCP: Billy Yeboah MD    HPI  Pradeep Arias is a 29 y.o. female who is a new patient to me who presents to clinic for constipation.     Constipation    How Long:   few years   Frequency of Bowel Movements:   2 times a week   Straining:   yes   Complete Evacuation:   no  Joes Type 1    Hematochezia:   no   Abdominal Pain:  No pain  Bloating/discomfort  Taking Gas x   Unexplained Weight Loss/Change in Bowel Habits:   no   Water Intake:      Daily Fiber Supplement:  no     Treatments: avoiding dairy, Gas X, Lactaid, Suppositories        GLP-1s: No  NSAIDs: No  Anticoagulation or Antiplatelet: No    History of H.pylori: no  H.pylori Treatment:  Prior Upper Endoscopy: no  Prior Colonoscopy: no  Family h/o Colon Cancer: No  Family h/o Crohn's Disease or Ulcerative Colitis: No  Family h/o Celiac Sprue: No  Abdominal Surgeries: no    Review of Systems   Constitutional:  Negative for weight loss.   HENT:  Negative for sore throat.    Eyes:  Negative for blurred vision.   Respiratory:  Negative for cough.    Cardiovascular:  Negative for chest pain.   Gastrointestinal:  Positive for constipation. Negative for blood in stool, diarrhea, heartburn, melena, nausea and vomiting. Abdominal pain: mid to lower abdominal bloating.  Genitourinary:  Negative for dysuria.   Musculoskeletal:  Negative for myalgias.   Skin:  Negative for rash.   Neurological:  Negative for headaches.   Endo/Heme/Allergies:  Negative for environmental allergies.   Psychiatric/Behavioral:  Negative for suicidal ideas. The patient is not nervous/anxious.        Past Medical History: has a past medical history of Abnormal Pap smear of cervix, Acute vaginitis, and Herpes simplex virus (HSV) infection.    Past Surgical History: has a past surgical history that includes Fracture surgery; cervix biospy; and  "lymphnode biospy.    Family History:family history includes Breast cancer in her paternal aunt; Cancer in her paternal grandmother; Stroke in her maternal grandfather and paternal grandfather.    Allergies:   Review of patient's allergies indicates:   Allergen Reactions    Codeine Rash     Can take percocet       Social History: reports that she has never smoked. She has never used smokeless tobacco. She reports current alcohol use. She reports that she does not use drugs.    Home medications:   Current Outpatient Medications on File Prior to Visit   Medication Sig Dispense Refill    azelastine (ASTELIN) 137 mcg (0.1 %) nasal spray 1 spray (137 mcg total) by Nasal route 2 (two) times daily. 30 mL 0    levocetirizine (XYZAL) 5 MG tablet Take 1 tablet (5 mg total) by mouth every evening. 30 tablet 2    montelukast (SINGULAIR) 10 mg tablet Take 1 tablet (10 mg total) by mouth every evening. 30 tablet 0    norelgestromin-ethinyl estradiol (XULANE) 150-35 mcg/24 hr Place 1 patch onto the skin once a week. 12 patch 3    [DISCONTINUED] fexofenadine (ALLEGRA) 180 MG tablet Take 1 tablet (180 mg total) by mouth once daily. 30 tablet 3    [DISCONTINUED] loratadine (CLARITIN) 10 mg tablet Take 1 tablet (10 mg total) by mouth once daily. 60 tablet 0    [DISCONTINUED] phentermine (ADIPEX-P) 37.5 mg tablet TK 1 T PO QD 30 tablet 0    [DISCONTINUED] semaglutide (OZEMPIC) 0.25 mg or 0.5 mg (2 mg/3 mL) pen injector Inject 0.25 mg into the skin every 7 days. 4 each 0    [DISCONTINUED] traMADoL (ULTRAM) 50 mg tablet Take 50 mg by mouth 3 (three) times daily as needed.       No current facility-administered medications on file prior to visit.       Vital signs:  /84 (BP Location: Left arm, Patient Position: Sitting)   Pulse 96   Ht 5' 4" (1.626 m)   Wt 82.6 kg (182 lb 1.6 oz)   SpO2 98%   BMI 31.26 kg/m²     Physical Exam  Vitals reviewed.   Constitutional:       Appearance: Normal appearance.   HENT:      Head: " "Normocephalic.   Pulmonary:      Effort: Pulmonary effort is normal. No respiratory distress.   Abdominal:      General: There is no distension.      Palpations: Abdomen is soft.      Tenderness: There is no abdominal tenderness.   Skin:     General: Skin is warm.   Neurological:      Mental Status: She is alert and oriented to person, place, and time.   Psychiatric:         Behavior: Behavior normal.         Thought Content: Thought content normal.         Routine labs:  Lab Results   Component Value Date    WBC 7.7 07/17/2018    HGB 12.8 07/17/2018    HCT 39.7 07/17/2018    MCV 80 07/17/2018     07/17/2018     No results found for: "INR"  Lab Results   Component Value Date    IRON 69 07/17/2018    FERRITIN 60 07/17/2018    TIBC 286 07/17/2018     Lab Results   Component Value Date     07/17/2018    K 4.3 07/17/2018     (H) 07/17/2018    CO2 21 07/17/2018    BUN 11 07/17/2018    CREATININE 0.72 07/17/2018     Lab Results   Component Value Date    ALBUMIN 4.0 07/17/2018    ALT 21 07/17/2018    AST 39 07/17/2018    ALKPHOS 68 07/17/2018    BILITOT 0.3 07/17/2018     No results found for: "GLUCOSE"  Lab Results   Component Value Date    TSH 2.260 07/17/2018     Lab Results   Component Value Date    CALCIUM 9.0 07/17/2018       Imaging:      I have reviewed prior labs, imaging, and notes.      Assessment:  1. Chronic idiopathic constipation      Constipation accompanied by mid to lower abdominal bloating. Bloating improves with bowel movements.   Bowel movements occurring twice a week. Henrietta Type 1 stool.   Has tried dietary modifications, Lactaid, Gas X, and suppositories.     Plan:  Orders Placed This Encounter    CBC Auto Differential    Comprehensive Metabolic Panel    TSH    IgA    Tissue Transglutaminase, IgA    linaCLOtide (LINZESS) 72 mcg Cap capsule     Labs  Linzess 72 mcg      Plan of care discussed with patient who is in agreement and verbalized understanding.     I have explained the " planned procedures to the patient.The risks, benefits and alternatives of the procedure were also explained in detail. Patient verbalized understanding, all questions were answered. The patient agrees to proceed as planned    Follow Up: 3 months          EDDIE Castillo,FNP-BC [facility]    (Portions of this note were dictated using voice recognition software and may contain dictation related errors in spelling/grammar/syntax not found on text review)

## 2025-01-08 ENCOUNTER — TELEPHONE (OUTPATIENT)
Dept: OTOLARYNGOLOGY | Facility: CLINIC | Age: 30
End: 2025-01-08
Payer: MEDICAID

## 2025-01-08 NOTE — TELEPHONE ENCOUNTER
Lvm for pt to return call due to having appt with Dr. Robin on 01/23/2025, scheduled at virtual but needs to be in person.

## 2025-01-22 ENCOUNTER — TELEPHONE (OUTPATIENT)
Dept: OTOLARYNGOLOGY | Facility: CLINIC | Age: 30
End: 2025-01-22
Payer: MEDICAID

## 2025-03-11 ENCOUNTER — OFFICE VISIT (OUTPATIENT)
Dept: OTOLARYNGOLOGY | Facility: CLINIC | Age: 30
End: 2025-03-11
Payer: MEDICAID

## 2025-03-11 VITALS
SYSTOLIC BLOOD PRESSURE: 120 MMHG | WEIGHT: 169.56 LBS | BODY MASS INDEX: 29.1 KG/M2 | DIASTOLIC BLOOD PRESSURE: 87 MMHG | HEART RATE: 78 BPM

## 2025-03-11 DIAGNOSIS — J34.3 HYPERTROPHY OF INFERIOR NASAL TURBINATE: Chronic | ICD-10-CM

## 2025-03-11 DIAGNOSIS — J34.2 NASAL SEPTAL DEVIATION: Chronic | ICD-10-CM

## 2025-03-11 DIAGNOSIS — J32.8 OTHER CHRONIC SINUSITIS: Primary | Chronic | ICD-10-CM

## 2025-03-11 DIAGNOSIS — J30.9 CHRONIC ALLERGIC RHINITIS: Chronic | ICD-10-CM

## 2025-03-11 PROCEDURE — 1159F MED LIST DOCD IN RCRD: CPT | Mod: CPTII,,, | Performed by: OTOLARYNGOLOGY

## 2025-03-11 PROCEDURE — 3008F BODY MASS INDEX DOCD: CPT | Mod: CPTII,,, | Performed by: OTOLARYNGOLOGY

## 2025-03-11 PROCEDURE — 99999 PR PBB SHADOW E&M-EST. PATIENT-LVL III: CPT | Mod: PBBFAC,,, | Performed by: OTOLARYNGOLOGY

## 2025-03-11 PROCEDURE — 99213 OFFICE O/P EST LOW 20 MIN: CPT | Mod: PBBFAC,PN | Performed by: OTOLARYNGOLOGY

## 2025-03-11 PROCEDURE — 3074F SYST BP LT 130 MM HG: CPT | Mod: CPTII,,, | Performed by: OTOLARYNGOLOGY

## 2025-03-11 PROCEDURE — 99204 OFFICE O/P NEW MOD 45 MIN: CPT | Mod: 25,S$PBB,, | Performed by: OTOLARYNGOLOGY

## 2025-03-11 PROCEDURE — 3079F DIAST BP 80-89 MM HG: CPT | Mod: CPTII,,, | Performed by: OTOLARYNGOLOGY

## 2025-03-11 PROCEDURE — 31231 NASAL ENDOSCOPY DX: CPT | Mod: PBBFAC,PN | Performed by: OTOLARYNGOLOGY

## 2025-03-11 RX ORDER — FLUTICASONE PROPIONATE 50 MCG
2 SPRAY, SUSPENSION (ML) NASAL DAILY
Qty: 9.9 ML | Refills: 11 | Status: SHIPPED | OUTPATIENT
Start: 2025-03-11

## 2025-03-11 RX ORDER — AMOXICILLIN AND CLAVULANATE POTASSIUM 875; 125 MG/1; MG/1
1 TABLET, FILM COATED ORAL 2 TIMES DAILY
Qty: 42 TABLET | Refills: 0 | Status: SHIPPED | OUTPATIENT
Start: 2025-03-11 | End: 2025-03-14

## 2025-03-11 RX ORDER — LEVOCETIRIZINE DIHYDROCHLORIDE 5 MG/1
5 TABLET, FILM COATED ORAL NIGHTLY
Qty: 30 TABLET | Refills: 11 | Status: SHIPPED | OUTPATIENT
Start: 2025-03-11 | End: 2026-03-11

## 2025-03-11 NOTE — PROGRESS NOTES
Chief Complaint   Patient presents with    Sinus Problem     Sinus pressure is constant  (night time is when it hurts the most )   .    HPI:     Pradeep Arias is a 29 y.o. female who presents for evaluation of a several year history of nasal congestion and postnasal drip. She notes that she has had sinonasal symptoms for several years but worsening over the last year. She thinks that her symptoms may be worse since moving into a different home about a year ago.   She notes that her nose is itchy.  She describes difficulty breathing at night. There is bilateral nasal obstruction. She does not use sinus rinses or nasal sprays. She does use zyrtec and OTC sinus decongestants intermittently. She admits to midface pain and pressure.  She admits to rhinorrhea and postnasal drip. There is not maxillary tooth pain. She  admits to headaches.  She has not had sinus or nasal surgery. There is no history of sinonasal trauma. She feels that she may have more symptoms when she is around dogs.       Past Medical History:   Diagnosis Date    Abnormal Pap smear of cervix 03/22/2017    Acute vaginitis 8/30/2017    Herpes simplex virus (HSV) infection      Social History[1]  Past Surgical History:   Procedure Laterality Date    cervix biospy      FRACTURE SURGERY      lymphnode biospy       Family History   Problem Relation Name Age of Onset    Breast cancer Paternal Aunt      Stroke Paternal Grandfather      Cancer Paternal Grandmother      Stroke Maternal Grandfather      Colon cancer Neg Hx      Ovarian cancer Neg Hx             Review of Systems  General: negative for chills, fever or weight loss  Psychological: negative for mood changes or depression  Ophthalmic: negative for blurry vision, photophobia or eye pain  ENT: see HPI  Respiratory: no cough, shortness of breath, or wheezing  Cardiovascular: no chest pain or dyspnea on exertion  Gastrointestinal: no abdominal pain, change in bowel habits, or black/  bloody stools  Musculoskeletal: negative for gait disturbance or muscular weakness  Neurological: no syncope or seizures; no ataxia  Dermatological: negative for puritis,  rash and jaundice  Hematologic/lymphatic: no easy bruising, no new lumps or bumps      Physical Exam:    Vitals:    03/11/25 1029   BP: 120/87   Pulse: 78       Constitutional: Well appearing / communicating without difficutly.  NAD.  Eyes: EOM I Bilaterally  Head/Face: Normocephalic.  Negative paranasal sinus pressure/tenderness.  Salivary glands WNL.  House Brackmann I Bilaterally.    Right Ear: Auricle normal appearance. External Auditory Canal within normal limits,TM w/o masses/lesions/perforations. TM mobility noted.   Left Ear: Auricle normal appearance. External Auditory Canal WNL,TM w/o masses/lesions/perforations. TM mobility noted.  Nose: +nasal septal deviation to the left. Inferior Turbinates 3+ bilaterally. No septal perforation. No masses/lesions. External nasal skin appears normal without masses/lesions.  Oral Cavity: Gingiva/lips within normal limits.  Dentition/gingiva healthy appearing. Mucus membranes moist. Floor of mouth soft, no masses palpated. Oral Tongue mobile. Hard Palate appears normal.    Oropharynx: Base of tongue appears normal. No masses/lesions noted. Tonsillar fossa/pharyngeal wall without lesions. Posterior oropharynx WNL.  Soft palate without masses. Midline uvula.   Neck/Lymphatic: No LAD I-VI bilaterally.  No thyromegaly.  No masses noted on exam.            Assessment:    ICD-10-CM ICD-9-CM    1. Other chronic sinusitis  J32.8 473.8       2. Chronic allergic rhinitis  J30.9 477.9       3. Nasal septal deviation  J34.2 470       4. Hypertrophy of inferior nasal turbinate  J34.3 478.0         The primary encounter diagnosis was Other chronic sinusitis. Diagnoses of Chronic allergic rhinitis, Nasal septal deviation, and Hypertrophy of inferior nasal turbinate were also pertinent to this visit.      Plan:  No  orders of the defined types were placed in this encounter.    Recommend Rosalio Med Sinus Rinse BID; distilled water only(maintenance).  Start Flonase 2 sprays per nostril daiy (spray laterally).  Start xyzal 5mg PO daily  Start Augmentin 875mg PO BID for 21 days  Obtain immunoCAP testing to further ascertain allergic triggers.   Obtain CT scan of the sinuses to further assess intraluminal patency.     Margy Flowers MD         [1]   Social History  Socioeconomic History    Marital status: Single   Tobacco Use    Smoking status: Never    Smokeless tobacco: Never   Substance and Sexual Activity    Alcohol use: Yes     Comment: socially    Drug use: No    Sexual activity: Not Currently     Partners: Male     Birth control/protection: None

## 2025-03-11 NOTE — PROCEDURES
Nasal/sinus endoscopy    Date/Time: 3/11/2025 10:20 AM    Performed by: Margy Peterson MD  Authorized by: Margy Peterson MD    Consent Done?:  Yes (Verbal)  Anesthesia:     Local anesthetic:  Lidocaine 2% and Mookie-Synephrine 1/2%  Nose:     Procedure Performed:  Nasal Endoscopy  External:      No external nasal deformity  Intranasal:      Mucosa no polyps     Mucosa ulcers not present     No mucosa lesions present     Enlarged turbinates     Septum gross deformity (NSD to the right with narrowing of inferior meatus on the right)  Nasopharynx:      Posterior choanae patent     Eustachian tube patent     Bilateral middle turbinate edema with narrowing of the middle meatus bilaterally; superior turbinates within normal limits; +bilateral sphenoethmoid recess patent

## 2025-03-14 ENCOUNTER — OFFICE VISIT (OUTPATIENT)
Dept: GASTROENTEROLOGY | Facility: CLINIC | Age: 30
End: 2025-03-14
Payer: MEDICAID

## 2025-03-14 VITALS
WEIGHT: 168.56 LBS | DIASTOLIC BLOOD PRESSURE: 84 MMHG | SYSTOLIC BLOOD PRESSURE: 115 MMHG | HEART RATE: 83 BPM | OXYGEN SATURATION: 98 % | BODY MASS INDEX: 28.78 KG/M2 | HEIGHT: 64 IN

## 2025-03-14 DIAGNOSIS — K59.04 CHRONIC IDIOPATHIC CONSTIPATION: Primary | ICD-10-CM

## 2025-03-14 PROCEDURE — 99213 OFFICE O/P EST LOW 20 MIN: CPT | Mod: PBBFAC,PN | Performed by: NURSE PRACTITIONER

## 2025-03-14 PROCEDURE — 99999 PR PBB SHADOW E&M-EST. PATIENT-LVL III: CPT | Mod: PBBFAC,,, | Performed by: NURSE PRACTITIONER

## 2025-03-14 NOTE — PATIENT INSTRUCTIONS
Continue the Linzess once a day.     Start Benefiber daily fiber supplement.     If no improvement in a month, we can consider changing the Linzess to Ibsrela.

## 2025-03-14 NOTE — PROGRESS NOTES
GASTROENTEROLOGY CLINIC NOTE    Chief Complaint: The encounter diagnosis was Chronic idiopathic constipation.  Referring provider/PCP: Billy Yeboah MD    Interval Note 3/14/2025  Catrachita Arias who is known to me presents for follow up regarding constipation. Following last office visit, Linzess 72 mcg initiated.   Linzess helping with consistency of stool and bloating but continues to have bowel movements twice a week. Some diarrhea noted with Linzess.   Denies any warning signs/symptoms.       ========================================  Initial HPI  Pradeep Arias is a 29 y.o. female who is a new patient to me who presents to clinic for constipation.     Constipation    How Long:   few years   Frequency of Bowel Movements:   2 times a week   Straining:   yes   Complete Evacuation:   no  [place] Type 1    Hematochezia:   no   Abdominal Pain:  No pain  Bloating/discomfort  Taking Gas x   Unexplained Weight Loss/Change in Bowel Habits:   no   Water Intake:      Daily Fiber Supplement:  no     Treatments: avoiding dairy, Gas X, Lactaid, Suppositories        GLP-1s: No  NSAIDs: No  Anticoagulation or Antiplatelet: No    History of H.pylori: no  H.pylori Treatment:  Prior Upper Endoscopy: no  Prior Colonoscopy: no  Family h/o Colon Cancer: No  Family h/o Crohn's Disease or Ulcerative Colitis: No  Family h/o Celiac Sprue: No  Abdominal Surgeries: no    Review of Systems   Constitutional:  Negative for weight loss.   HENT:  Negative for sore throat.    Eyes:  Negative for blurred vision.   Respiratory:  Negative for cough.    Cardiovascular:  Negative for chest pain.   Gastrointestinal:  Positive for constipation. Negative for abdominal pain, blood in stool, diarrhea, heartburn, melena, nausea and vomiting.   Genitourinary:  Negative for dysuria.   Musculoskeletal:  Negative for myalgias.   Skin:  Negative for rash.   Neurological:  Negative for headaches.   Endo/Heme/Allergies:  Negative for  environmental allergies.   Psychiatric/Behavioral:  Negative for suicidal ideas. The patient is not nervous/anxious.        Past Medical History: has a past medical history of Abnormal Pap smear of cervix, Acute vaginitis, and Herpes simplex virus (HSV) infection.    Past Surgical History: has a past surgical history that includes Fracture surgery; cervix biospy; and lymphnode biospy.    Family History:family history includes Breast cancer in her paternal aunt; Cancer in her paternal grandmother; Stroke in her maternal grandfather and paternal grandfather.    Allergies:   Review of patient's allergies indicates:   Allergen Reactions    Codeine Rash     Can take percocet       Social History: reports that she has never smoked. She has never used smokeless tobacco. She reports current alcohol use. She reports that she does not use drugs.    Home medications:   Current Outpatient Medications on File Prior to Visit   Medication Sig Dispense Refill    azelastine (ASTELIN) 137 mcg (0.1 %) nasal spray 1 spray (137 mcg total) by Nasal route 2 (two) times daily. 30 mL 0    fluticasone propionate (FLONASE) 50 mcg/actuation nasal spray 2 sprays (100 mcg total) by Each Nostril route once daily. 9.9 mL 11    levocetirizine (XYZAL) 5 MG tablet Take 1 tablet (5 mg total) by mouth every evening. 30 tablet 11    linaCLOtide (LINZESS) 72 mcg Cap capsule Take 1 capsule (72 mcg total) by mouth before breakfast. 30 capsule 11    montelukast (SINGULAIR) 10 mg tablet Take 1 tablet (10 mg total) by mouth every evening. 30 tablet 0    norelgestromin-ethinyl estradiol (XULANE) 150-35 mcg/24 hr Place 1 patch onto the skin once a week. 12 patch 3    [DISCONTINUED] amoxicillin-clavulanate 875-125mg (AUGMENTIN) 875-125 mg per tablet Take 1 tablet by mouth 2 (two) times daily. for 21 days (Patient not taking: Reported on 3/14/2025) 42 tablet 0     No current facility-administered medications on file prior to visit.       Vital signs:  /84  "(BP Location: Right arm, Patient Position: Sitting)   Pulse 83   Ht 5' 4" (1.626 m)   Wt 76.5 kg (168 lb 8.7 oz)   SpO2 98%   BMI 28.93 kg/m²     Physical Exam  Vitals reviewed.   Constitutional:       Appearance: Normal appearance.   HENT:      Head: Normocephalic.   Pulmonary:      Effort: Pulmonary effort is normal. No respiratory distress.   Abdominal:      General: There is no distension.      Palpations: Abdomen is soft.      Tenderness: There is no abdominal tenderness.   Skin:     General: Skin is warm.   Neurological:      Mental Status: She is alert and oriented to person, place, and time.   Psychiatric:         Behavior: Behavior normal.         Thought Content: Thought content normal.         Routine labs:  Lab Results   Component Value Date    WBC 9.46 12/13/2024    HGB 13.3 12/13/2024    HCT 43.4 12/13/2024    MCV 86 12/13/2024     12/13/2024     No results found for: "INR"  Lab Results   Component Value Date    IRON 69 07/17/2018    FERRITIN 60 07/17/2018    TIBC 286 07/17/2018     Lab Results   Component Value Date     12/13/2024    K 4.4 12/13/2024     12/13/2024    CO2 25 12/13/2024    BUN 14 12/13/2024    CREATININE 0.9 12/13/2024     Lab Results   Component Value Date    ALBUMIN 3.8 12/13/2024    ALT 11 12/13/2024    AST 14 12/13/2024    ALKPHOS 68 12/13/2024    BILITOT 0.4 12/13/2024     No results found for: "GLUCOSE"  Lab Results   Component Value Date    TSH 2.289 12/13/2024     Lab Results   Component Value Date    CALCIUM 9.2 12/13/2024       Imaging:      I have reviewed prior labs, imaging, and notes.      Assessment:  1. Chronic idiopathic constipation      Linzess helping with stool consistency and bloating. Continues to have bowel movements twice a week.     Has tried dietary modifications, Lactaid, Gas X, and suppositories.     Plan:     Continue Linzess 72 mcg daily as prescribed.  Start daily fiber supplement such as Benefiber  Will try this for a month then " send us a message. If no improvement, consider switching to Ibsrela         Plan of care discussed with patient who is in agreement and verbalized understanding.     I have explained the planned procedures to the patient.The risks, benefits and alternatives of the procedure were also explained in detail. Patient verbalized understanding, all questions were answered. The patient agrees to proceed as planned    Follow Up: NAS Sanchez, EDDIE,FNP-BC  [facility] [place]    (Portions of this note were dictated using voice recognition software and may contain dictation related errors in spelling/grammar/syntax not found on text review)

## 2025-08-12 ENCOUNTER — OFFICE VISIT (OUTPATIENT)
Dept: OBSTETRICS AND GYNECOLOGY | Facility: CLINIC | Age: 30
End: 2025-08-12
Payer: MEDICAID

## 2025-08-12 ENCOUNTER — LAB VISIT (OUTPATIENT)
Dept: LAB | Facility: HOSPITAL | Age: 30
End: 2025-08-12
Attending: NURSE PRACTITIONER
Payer: MEDICAID

## 2025-08-12 VITALS
SYSTOLIC BLOOD PRESSURE: 124 MMHG | HEIGHT: 64 IN | WEIGHT: 182.56 LBS | BODY MASS INDEX: 31.17 KG/M2 | DIASTOLIC BLOOD PRESSURE: 80 MMHG

## 2025-08-12 DIAGNOSIS — Z12.4 CERVICAL CANCER SCREENING: ICD-10-CM

## 2025-08-12 DIAGNOSIS — Z32.01 POSITIVE PREGNANCY TEST: ICD-10-CM

## 2025-08-12 DIAGNOSIS — C53.9 CERVICAL ADENOCARCINOMA: ICD-10-CM

## 2025-08-12 DIAGNOSIS — R76.8 HEPATITIS C ANTIBODY TEST POSITIVE: ICD-10-CM

## 2025-08-12 DIAGNOSIS — Z32.00 POSSIBLE PREGNANCY, NOT CONFIRMED: ICD-10-CM

## 2025-08-12 DIAGNOSIS — Z32.01 POSITIVE PREGNANCY TEST: Primary | ICD-10-CM

## 2025-08-12 LAB
ABSOLUTE EOSINOPHIL (OHS): 0.11 K/UL
ABSOLUTE MONOCYTE (OHS): 1.07 K/UL (ref 0.3–1)
ABSOLUTE NEUTROPHIL COUNT (OHS): 12.32 K/UL (ref 1.8–7.7)
B-HCG UR QL: POSITIVE
BASOPHILS # BLD AUTO: 0.04 K/UL
BASOPHILS NFR BLD AUTO: 0.3 %
CTP QC/QA: YES
EAG (OHS): 94 MG/DL (ref 68–131)
ERYTHROCYTE [DISTWIDTH] IN BLOOD BY AUTOMATED COUNT: 13.1 % (ref 11.5–14.5)
FERRITIN SERPL-MCNC: 73 NG/ML (ref 20–300)
HBA1C MFR BLD: 4.9 % (ref 4–5.6)
HCT VFR BLD AUTO: 38.3 % (ref 37–48.5)
HGB BLD-MCNC: 12.1 GM/DL (ref 12–16)
HIV 1+2 AB+HIV1 P24 AG SERPL QL IA: NORMAL
IMM GRANULOCYTES # BLD AUTO: 0.1 K/UL (ref 0–0.04)
IMM GRANULOCYTES NFR BLD AUTO: 0.6 % (ref 0–0.5)
INDIRECT COOMBS: NORMAL
LYMPHOCYTES # BLD AUTO: 1.79 K/UL (ref 1–4.8)
MCH RBC QN AUTO: 26 PG (ref 27–31)
MCHC RBC AUTO-ENTMCNC: 31.6 G/DL (ref 32–36)
MCV RBC AUTO: 82 FL (ref 82–98)
NUCLEATED RBC (/100WBC) (OHS): 0 /100 WBC
PLATELET # BLD AUTO: 318 K/UL (ref 150–450)
PMV BLD AUTO: 10.6 FL (ref 9.2–12.9)
RBC # BLD AUTO: 4.65 M/UL (ref 4–5.4)
RELATIVE EOSINOPHIL (OHS): 0.7 %
RELATIVE LYMPHOCYTE (OHS): 11.6 % (ref 18–48)
RELATIVE MONOCYTE (OHS): 6.9 % (ref 4–15)
RELATIVE NEUTROPHIL (OHS): 79.9 % (ref 38–73)
RH BLD: NORMAL
SPECIMEN OUTDATE: NORMAL
T PALLIDUM IGG+IGM SER QL: NORMAL
WBC # BLD AUTO: 15.43 K/UL (ref 3.9–12.7)

## 2025-08-12 PROCEDURE — 99999 PR PBB SHADOW E&M-EST. PATIENT-LVL III: CPT | Mod: PBBFAC,,, | Performed by: NURSE PRACTITIONER

## 2025-08-12 PROCEDURE — 83036 HEMOGLOBIN GLYCOSYLATED A1C: CPT

## 2025-08-12 PROCEDURE — 99213 OFFICE O/P EST LOW 20 MIN: CPT | Mod: PBBFAC,TH,PO | Performed by: NURSE PRACTITIONER

## 2025-08-12 PROCEDURE — 82728 ASSAY OF FERRITIN: CPT

## 2025-08-12 PROCEDURE — 81025 URINE PREGNANCY TEST: CPT | Mod: PBBFAC,PO | Performed by: NURSE PRACTITIONER

## 2025-08-12 PROCEDURE — 1159F MED LIST DOCD IN RCRD: CPT | Mod: CPTII,,, | Performed by: NURSE PRACTITIONER

## 2025-08-12 PROCEDURE — 99999PBSHW POCT URINE PREGNANCY: Mod: PBBFAC,,,

## 2025-08-12 PROCEDURE — 1160F RVW MEDS BY RX/DR IN RCRD: CPT | Mod: CPTII,,, | Performed by: NURSE PRACTITIONER

## 2025-08-12 PROCEDURE — 3074F SYST BP LT 130 MM HG: CPT | Mod: CPTII,,, | Performed by: NURSE PRACTITIONER

## 2025-08-12 PROCEDURE — 83020 HEMOGLOBIN ELECTROPHORESIS: CPT | Mod: 26,,, | Performed by: PATHOLOGY

## 2025-08-12 PROCEDURE — 36415 COLL VENOUS BLD VENIPUNCTURE: CPT | Mod: PO

## 2025-08-12 PROCEDURE — 87624 HPV HI-RISK TYP POOLED RSLT: CPT | Performed by: NURSE PRACTITIONER

## 2025-08-12 PROCEDURE — 3079F DIAST BP 80-89 MM HG: CPT | Mod: CPTII,,, | Performed by: NURSE PRACTITIONER

## 2025-08-12 PROCEDURE — 3008F BODY MASS INDEX DOCD: CPT | Mod: CPTII,,, | Performed by: NURSE PRACTITIONER

## 2025-08-12 PROCEDURE — 99204 OFFICE O/P NEW MOD 45 MIN: CPT | Mod: S$PBB,,, | Performed by: NURSE PRACTITIONER

## 2025-08-12 PROCEDURE — 83020 HEMOGLOBIN ELECTROPHORESIS: CPT

## 2025-08-12 PROCEDURE — 87389 HIV-1 AG W/HIV-1&-2 AB AG IA: CPT

## 2025-08-12 PROCEDURE — 80074 ACUTE HEPATITIS PANEL: CPT

## 2025-08-12 PROCEDURE — 85025 COMPLETE CBC W/AUTO DIFF WBC: CPT

## 2025-08-12 PROCEDURE — 87522 HEPATITIS C REVRS TRNSCRPJ: CPT

## 2025-08-12 PROCEDURE — 86593 SYPHILIS TEST NON-TREP QUANT: CPT

## 2025-08-12 PROCEDURE — 86592 SYPHILIS TEST NON-TREP QUAL: CPT

## 2025-08-12 PROCEDURE — 87591 N.GONORRHOEAE DNA AMP PROB: CPT | Performed by: NURSE PRACTITIONER

## 2025-08-12 PROCEDURE — 86900 BLOOD TYPING SEROLOGIC ABO: CPT | Performed by: NURSE PRACTITIONER

## 2025-08-12 PROCEDURE — 99999PBSHW PR PBB SHADOW TECHNICAL ONLY FILED TO HB: Mod: PBBFAC,,,

## 2025-08-13 LAB
HAV IGM SERPL QL IA: ABNORMAL
HBV CORE IGM SERPL QL IA: ABNORMAL
HBV SURFACE AG SERPL QL IA: ABNORMAL
HCV AB SERPL QL IA: REACTIVE
HCV RNA SERPL NAA+PROBE-LOG IU: NOT DETECTED {LOG_IU}/ML
HGB A2 MFR BLD HPLC: 2.7 % (ref 2.2–3.2)
HGB FRACT BLD ELPH-IMP: NORMAL
PATHOLOGIST INTERPRETATION - HGB SERUM (OHS): NORMAL
RPR SER QL: NORMAL

## 2025-08-14 LAB
C TRACH DNA SPEC QL NAA+PROBE: NOT DETECTED
CTGC SOURCE (OHS) ORD-325: NORMAL
N GONORRHOEA DNA UR QL NAA+PROBE: NOT DETECTED

## 2025-08-21 ENCOUNTER — PROCEDURE VISIT (OUTPATIENT)
Dept: OBSTETRICS AND GYNECOLOGY | Facility: CLINIC | Age: 30
End: 2025-08-21
Payer: MEDICAID

## 2025-08-21 ENCOUNTER — INITIAL PRENATAL (OUTPATIENT)
Dept: OBSTETRICS AND GYNECOLOGY | Facility: CLINIC | Age: 30
End: 2025-08-21
Payer: MEDICAID

## 2025-08-21 VITALS
BODY MASS INDEX: 31.75 KG/M2 | SYSTOLIC BLOOD PRESSURE: 112 MMHG | DIASTOLIC BLOOD PRESSURE: 70 MMHG | WEIGHT: 184.94 LBS

## 2025-08-21 DIAGNOSIS — R76.8 HEPATITIS C ANTIBODY TEST POSITIVE: ICD-10-CM

## 2025-08-21 DIAGNOSIS — Z98.890 S/P CONIZATION OF CERVIX: ICD-10-CM

## 2025-08-21 DIAGNOSIS — Z34.81 ENCOUNTER FOR SUPERVISION OF OTHER NORMAL PREGNANCY IN FIRST TRIMESTER: Primary | ICD-10-CM

## 2025-08-21 DIAGNOSIS — Z32.01 POSITIVE PREGNANCY TEST: ICD-10-CM

## 2025-08-21 DIAGNOSIS — O34.10 UTERINE FIBROID IN PREGNANCY: ICD-10-CM

## 2025-08-21 DIAGNOSIS — O09.891 HX OF PRETERM DELIVERY, CURRENTLY PREGNANT, FIRST TRIMESTER: ICD-10-CM

## 2025-08-21 DIAGNOSIS — D25.9 UTERINE FIBROID IN PREGNANCY: ICD-10-CM

## 2025-08-21 PROCEDURE — 99212 OFFICE O/P EST SF 10 MIN: CPT | Mod: PBBFAC,TH,PO,25 | Performed by: ADVANCED PRACTICE MIDWIFE

## 2025-08-21 PROCEDURE — 76801 OB US < 14 WKS SINGLE FETUS: CPT | Mod: PBBFAC,PO | Performed by: OBSTETRICS & GYNECOLOGY

## 2025-08-21 PROCEDURE — 87086 URINE CULTURE/COLONY COUNT: CPT | Performed by: ADVANCED PRACTICE MIDWIFE

## 2025-08-21 PROCEDURE — 99999 PR PBB SHADOW E&M-EST. PATIENT-LVL II: CPT | Mod: PBBFAC,,, | Performed by: ADVANCED PRACTICE MIDWIFE

## 2025-08-23 LAB — BACTERIA UR CULT: NO GROWTH

## (undated) DEVICE — PACK PERI/GYN OPTIMA

## (undated) DEVICE — KIT WING PAD POSITIONING

## (undated) DEVICE — ELECTRODE REM PLYHSV RETURN 9

## (undated) DEVICE — TIP RUMI MANIPULATOR LAVENDER

## (undated) DEVICE — SEE MEDLINE ITEM 156923

## (undated) DEVICE — SOL 9P NACL IRR PIC IL

## (undated) DEVICE — SYR 10CC LUER LOCK

## (undated) DEVICE — SOL CLEARIFY VISUALIZATION LAP

## (undated) DEVICE — BLADE SURG STAINLESS STEEL #11

## (undated) DEVICE — SET TRI-LUMEN FILTERED TUBE

## (undated) DEVICE — BLADE SURG CARBON STEEL SZ11

## (undated) DEVICE — TRAY DRY SKIN SCRUB PREP

## (undated) DEVICE — IRRIGATOR ENDOSCOPY DISP.

## (undated) DEVICE — OBTURATOR BLADELESS 8MM XI

## (undated) DEVICE — SOL ELECTROLUBE ANTI-STIC

## (undated) DEVICE — TRAY MINOR GEN SURG

## (undated) DEVICE — SEAL UNIVERSAL 5MM-8MM XI

## (undated) DEVICE — DRESSING TELFA N ADH 3X8

## (undated) DEVICE — DRAPE ARM DAVINCI XI

## (undated) DEVICE — GLOVE BIOGEL SKINSENSE PI 6.5

## (undated) DEVICE — DEVICE STAT LOCK CATH SECURE

## (undated) DEVICE — SEE MEDLINE ITEM 152622

## (undated) DEVICE — JELLY KY LUBRICATING 5G PACKET

## (undated) DEVICE — SUT 1 27IN CHROMIC GUT CT-1

## (undated) DEVICE — SEE MEDLINE ITEM 154981

## (undated) DEVICE — SOL BETADINE 5%

## (undated) DEVICE — NDL INSUF ULTRA VERESS 120MM

## (undated) DEVICE — SUT MCRYL PLUS 4-0 PS2 27IN

## (undated) DEVICE — PAD PERINEAL SUPINE

## (undated) DEVICE — INSERT CUSHIONPRONE VIEW LARGE

## (undated) DEVICE — COVER TIP CURVED SCISSORS XI

## (undated) DEVICE — CORD BIPOLAR 12 FOOT

## (undated) DEVICE — SUT PROLENE 0 CT1 30IN BLUE

## (undated) DEVICE — POSITIONER HEAD ADULT

## (undated) DEVICE — CLIPPER BLADE MOD 4406 (CAREF)

## (undated) DEVICE — SOL NS 1000CC

## (undated) DEVICE — SUT 0 27IN CHROMIC GUT CT-1

## (undated) DEVICE — PENCIL ELECTROSURG HOLST W/BLD

## (undated) DEVICE — SUT V-LOC 180 ABD 2/0 GS-21

## (undated) DEVICE — BAG TISSUE RETRIEVAL 5MM

## (undated) DEVICE — ADHESIVE DERMABOND ADVANCED

## (undated) DEVICE — Device

## (undated) DEVICE — SWAB PROCTO 8 IN

## (undated) DEVICE — SEE MEDLINE ITEM 146313

## (undated) DEVICE — PORT ACCESS 8MM W/120MM LOW

## (undated) DEVICE — DRAPE COLUMN DAVINCI XI

## (undated) DEVICE — UNDERGLOVE BIOGEL PI SZ 6.5 LF

## (undated) DEVICE — UNDERGLOVES BIOGEL PI SZ 7 LF

## (undated) DEVICE — SOL WATER STRL IRR 1000ML

## (undated) DEVICE — SOL PVP-I SCRUB 7.5% 4OZ

## (undated) DEVICE — SUT CHROME GUT 1 CT-2 27